# Patient Record
Sex: FEMALE | Race: WHITE | Employment: OTHER | URBAN - METROPOLITAN AREA
[De-identification: names, ages, dates, MRNs, and addresses within clinical notes are randomized per-mention and may not be internally consistent; named-entity substitution may affect disease eponyms.]

---

## 2018-05-18 ENCOUNTER — OFFICE VISIT (OUTPATIENT)
Dept: NEUROLOGY | Age: 73
End: 2018-05-18

## 2018-05-18 VITALS
OXYGEN SATURATION: 99 % | SYSTOLIC BLOOD PRESSURE: 140 MMHG | BODY MASS INDEX: 24.8 KG/M2 | HEIGHT: 63 IN | HEART RATE: 70 BPM | RESPIRATION RATE: 14 BRPM | DIASTOLIC BLOOD PRESSURE: 74 MMHG | WEIGHT: 140 LBS

## 2018-05-18 DIAGNOSIS — R20.0 NUMBNESS OF FEET: ICD-10-CM

## 2018-05-18 DIAGNOSIS — M54.50 ACUTE LOW BACK PAIN WITHOUT SCIATICA, UNSPECIFIED BACK PAIN LATERALITY: Primary | ICD-10-CM

## 2018-05-18 RX ORDER — CALCIUM CARBONATE 400(1000)
TABLET,CHEWABLE ORAL
COMMUNITY
Start: 2015-07-02

## 2018-05-18 RX ORDER — CYCLOSPORINE 0.5 MG/ML
EMULSION OPHTHALMIC
Refills: 3 | COMMUNITY
Start: 2018-04-19

## 2018-05-18 RX ORDER — DIPHENHYDRAMINE HCL 25 MG
CAPSULE ORAL
COMMUNITY
Start: 2014-05-21

## 2018-05-18 RX ORDER — CHOLECALCIFEROL (VITAMIN D3) 125 MCG
5000 CAPSULE ORAL
COMMUNITY
Start: 2016-01-21

## 2018-05-18 RX ORDER — FLUTICASONE PROPIONATE 50 MCG
SPRAY, SUSPENSION (ML) NASAL
COMMUNITY
Start: 2014-05-21 | End: 2018-06-18

## 2018-05-18 RX ORDER — ASCORBIC ACID 250 MG
TABLET ORAL
COMMUNITY
Start: 2014-05-21

## 2018-05-18 RX ORDER — LOVASTATIN 20 MG/1
TABLET ORAL
COMMUNITY
Start: 2018-03-14

## 2018-05-18 RX ORDER — ASPIRIN 81 MG/1
TABLET ORAL
COMMUNITY
Start: 2014-05-21

## 2018-05-18 RX ORDER — ESTRADIOL 0.1 MG/G
CREAM VAGINAL
COMMUNITY
Start: 2014-05-21

## 2018-05-18 RX ORDER — LANOLIN ALCOHOL/MO/W.PET/CERES
CREAM (GRAM) TOPICAL
COMMUNITY
Start: 2016-01-21 | End: 2018-10-04

## 2018-05-18 RX ORDER — MECLIZINE HYDROCHLORIDE 25 MG/1
TABLET ORAL
COMMUNITY
Start: 2014-05-21

## 2018-05-18 RX ORDER — ACETAMINOPHEN 500 MG
TABLET ORAL
COMMUNITY
Start: 2014-05-21

## 2018-05-18 NOTE — PROGRESS NOTES
NEUROLOGY NEW PATIENT CONSULTATION    REFERRED BY:  Magaly Kee NP    CHIEF COMPLAINT:  sciatica    HISTORY OF PRESENT ILLNESS    HISTORY PROVIDED BY:  Patient  Family Member:       Isidro Morales is a 81 y.o. female who I am asked to see in consultation for pain in her right buttocks. This started for her several weeks ago. She notes that any activity pushing or pulling will trigger the pain. In 1991 she had a ruptured disc and had a laminectomy. She doesn't recall the level of the disc. She has some residual issues with her left foot having a drop foot. She reports the pain goes down the front and down her lateral shin and both toes get numb. Triggers: sitting or sitting incorrectly, lifting incorrectly, and sleeping on her side. She has numbness in the toes. She notes this with sleeping. She has had this for several weeks now. She has not had recent imaging. She did have a dexa scan and is on Vit D and calcium. She denies any history of neuropathy. She denies any significant neck pain. In September she had a physical and labs were fine. She isn't sure if all vitamin levels were checked. She does not feel like she needs any medication for the pain she just wants to make sure there is no underlying structural issue going on. She has never had an EMG/NCS. She denies any tremor, falls, or numbness in the hands. She does have arthritis and takes Advil/Tylenol for this.     PMH  Arthritis      Social History     Social History    Marital status:      Spouse name: N/A    Number of children: N/A    Years of education: N/A     Social History Main Topics    Smoking status: Not on file    Smokeless tobacco: Not on file    Alcohol use Not on file    Drug use: Not on file    Sexual activity: Not on file     Other Topics Concern    Not on file     Social History Narrative       FH    No significant family history    ALLERGIES  Allergies no known allergies    CURRENT MEDS  Current Outpatient Prescriptions   Medication Sig Dispense Refill    aspirin delayed-release (ADULT ASPIRIN REGIMEN) 81 mg tablet Take  by mouth.  diphenhydrAMINE (BENADRYL) 25 mg capsule Take  by mouth.  calcium citrate-vitamin d3 (CITRACAL+D) 315-200 mg-unit tab Take  by mouth.  multivit-min-FA-lycopen-lutein (CENTRUM SILVER) 0.4-300-250 mg-mcg-mcg tab Take  by mouth.  RESTASIS 0.05 % ophthalmic emulsion INSTILL 1 DROP IN BOTH EYES BID  3    estradiol (ESTRACE) 0.01 % (0.1 mg/gram) vaginal cream Insert  into vagina.  lovastatin (MEVACOR) 20 mg tablet       meclizine (ANTIVERT) 25 mg tablet Take  by mouth.  calcium carbonate (TUMS ULTRA) 400 mg calcium (1,000 mg) chew Take  by mouth.  acetaminophen (TYLENOL EXTRA STRENGTH) 500 mg tablet Take  by mouth.  ascorbic acid, vitamin C, (VITAMIN C) 250 mg tablet Take  by mouth.  cholecalciferol, vitamin D3, 2,000 unit tab Take  by mouth.  fluticasone (FLONASE ALLERGY RELIEF) 50 mcg/actuation nasal spray by Nasal route.          REVIEW OF SYSTEMS:     Y  N       Y  N  Y  N   Y  N  [] [x] AIDS          [] [x] Falls  [] [x] Memory Loss  [] [x]  Shortness of breath  [x] [] Anxiety          [] [x] Fatigue [] [x] Muscle Pain        [] [x]  Skipped beats  [] [x] Chest Pain   [] [x] Frequent HA [] [x] Ms Weakness     [] [x]  Snoring  [] [x] Constipation [x] []Hearing loss [] [x] Nause/Vomiting  [] [x]  Stomach Pain  [] [x] Cough          [] [x]Hepatitis [] [x] Neuropathy         [] [x]  Swallowing difficulty  [] [x] Depression  [] [x]Incontinence [] [x] Poor appetite      [x] []  Vertigo  [] [x] Diarrhea       [x] [x] Joint Pain [] [x] Rash                   [] [x]  Visual disturbances  [] [x] Fainting        [] [x] Leg Swelling [] [x] Ringing ears       [] [x]  Weight changes      []Unable to obtain  ROS due to  []mental status change  []sedated   []intubated          PREVIOUS WORKUP  IMAGING: No recent neuroimaging      LABS  No results found for this or any previous visit. PHYSICAL EXAM  Visit Vitals    /74    Pulse 70    Resp 14    Ht 5' 3\" (1.6 m)    Wt 63.5 kg (140 lb)    SpO2 99%    BMI 24.8 kg/m2     General:  Alert, cooperative, no distress. Head:  Normocephalic, without obvious abnormality, atraumatic. Eyes:  Conjunctivae/corneas clear. Pupils equal, round, reactive to light. Extraocular movements intact, VFF, NO papilledema   Lungs:  Heart:   Non labored breathing  Regular rate and rhythm, no carotid bruits   Abdomen:   Soft, non-distended   Extremities: Extremities normal, atraumatic, no cyanosis or edema. Pulses: 2+ and symmetric all extremities. Skin: Skin color, texture, turgor normal. No rashes or lesions. Neurologic:  Gen: Attention normal             Language: naming, repetition, fluency normal             Memory: intact recent and remote memory  Cranial Nerves:  I: smell Not tested   II: visual fields Full to confrontation   II: pupils Equal, round, reactive to light   II: optic disc No papilledema   III,VII: ptosis none   III,IV,VI: extraocular muscles  Full ROM   V: mastication normal   V: facial light touch sensation  normal   VII: facial muscle function   symmetric   VIII: hearing symmetric   IX: soft palate elevation  normal   XI: trapezius strength  5/5   XI: sternocleidomastoid strength 5/5   XI: neck flexion strength  5/5   XII: tongue  midline     Motor: normal bulk and tone, no tremor              Strength: 5/5 in upper extremities, 5-/5 in lower extremities  Sensory: intact to LT, PP, vibration, and temperature  Coordination: FTN intact, Rhomberg negative  Gait: normal gait including tandem   Reflexes: 2+ throughout       [address] [facility] is a 81 y.o. female who presents for evaluation of pain in the right low back/buttocks. I suspect given patient's history of disc disease she may have a new disc in her lumbar spine.   Based on the distribution of her numbness I suspect it could be L2/3. We will do an MRI to evaluate this. We will also do an EMG/NCS. RECOMMENDATIONS          Visit Diagnoses     Acute low back pain without sciatica, unspecified back pain laterality    -  Primary    Relevant Medications  · Tylenol/ibuprofen as needed    Other Relevant Orders    · MRI LUMB SPINE WO CONT to evaluate for disc disease    · EMG LIMITED of bilateral lower extremity to evaluate for neuropathy    Numbness of feet        Relevant Orders    · MRI LUMB SPINE WO CONT    · EMG LIMITED  · We will get labs from PCP may need to your additional neuropathy labs      If above workup is negative will send for physical therapy for core strengthening [facility] of [address]. FU in 4 weeks    Adelaide Cardenas MD    CC: Braulio Jefferson NP  Fax: [phone]    Medications and side effects discussed with patient in detail. With any new medications prescribed, patient was given instructions on administration and side effects. Written medication information was provided to the patient as well. This note was created using voice recognition software. Despite editing, there may be syntax errors. This note will not be viewable in [address]

## 2018-05-18 NOTE — LETTER
Dear Sukh Nassar NP, Thank you for allowing me to see your patient, Rosemary Jones for a neurological consultation. Please see my impression and recommendations as outlined in my note. Sincerely, Natalie Centeno MD 
[facility] at [address] record in preparation for visit and have necessary documentation Pt did not bring medication to office visit for review Medication list reviewed and reconciled with patient Information was given to pt on Advanced Directives, Living Will 
opportunity was given for questions Chief Complaint Patient presents with  
 Other  
  sciatica  Numbness Feet bilaterally 1. Have you been to the ER, urgent care clinic since your last visit? Hospitalized since your last visit? Yes ER 2009 at [facility] for palpations 2. Have you seen or consulted any other health care providers outside of the [address] [place] [address] since your last visit? Include any pap smears or colon screening. NO 
 
 
 
NEUROLOGY NEW PATIENT CONSULTATION 
 
REFERRED BY: 
Sukh Nassar NP 
 
CHIEF COMPLAINT: 
sciatica HISTORY OF PRESENT ILLNESS HISTORY PROVIDED BY: 
Patient Family Member:  Rosemary Jones is a 81 y.o. female who I am asked to see in consultation for pain in her right buttocks. This started for her several weeks ago. She notes that any activity pushing or pulling will trigger the pain. In 1991 she had a ruptured disc and had a laminectomy. She doesn't recall the level of the disc. She has some residual issues with her left foot having a drop foot. She reports the pain goes down the front and down her lateral shin and both toes get numb. Triggers: sitting or sitting incorrectly, lifting incorrectly, and sleeping on her side. She has numbness in the toes. She notes this with sleeping. She has had this for several weeks now. She has not had recent imaging.  She did have a dexa scan and is on Vit D and calcium. She denies any history of neuropathy. She denies any significant neck pain. In September she had a physical and labs were fine. She isn't sure if all vitamin levels were checked. She does not feel like she needs any medication for the pain she just wants to make sure there is no underlying structural issue going on. She has never had an EMG/NCS. She denies any tremor, falls, or numbness in the hands. She does have arthritis and takes Advil/Tylenol for this. [facility] Social History Social History  Marital status:  Spouse name: N/A  
 Number of children: N/A  
 Years of education: N/A Social History Main Topics  Smoking status: Not on file  Smokeless tobacco: Not on file  Alcohol use Not on file  Drug use: Not on file  Sexual activity: Not on file Other Topics Concern  Not on file Social History Narrative Valente Merlos No significant family history ALLERGIES Allergies no known allergies CURRENT MEDS Current Outpatient Prescriptions Medication Sig Dispense Refill  aspirin delayed-release (ADULT ASPIRIN REGIMEN) 81 mg tablet Take  by mouth.  diphenhydrAMINE (BENADRYL) 25 mg capsule Take  by mouth.  calcium citrate-vitamin d3 (CITRACAL+D) 315-200 mg-unit tab Take  by mouth.  multivit-min-FA-lycopen-lutein (CENTRUM SILVER) 0.4-300-250 mg-mcg-mcg tab Take  by mouth.  RESTASIS 0.05 % ophthalmic emulsion INSTILL 1 DROP IN BOTH EYES BID  3  
 estradiol (ESTRACE) 0.01 % (0.1 mg/gram) vaginal cream Insert  into vagina.  lovastatin (MEVACOR) 20 mg tablet  meclizine (ANTIVERT) 25 mg tablet Take  by mouth.  calcium carbonate (TUMS ULTRA) 400 mg calcium (1,000 mg) chew Take  by mouth.  acetaminophen (TYLENOL EXTRA STRENGTH) 500 mg tablet Take  by mouth.  ascorbic acid, vitamin C, (VITAMIN C) 250 mg tablet Take  by mouth.  cholecalciferol, vitamin D3, 2,000 unit tab Take  by mouth.  fluticasone (FLONASE ALLERGY RELIEF) 50 mcg/actuation nasal spray by Nasal route. REVIEW OF SYSTEMS:  
 
Y  N       Y  N  Y  N   Y  N 
[] [x] AIDS          [] [x] Falls  [] [x] Memory Loss  [] [x]  Shortness of breath [x] [] Anxiety          [] [x] Fatigue [] [x] Muscle Pain        [] [x]  Skipped beats 
[] [x] Chest Pain   [] [x] Frequent HA [] [x] Ms Weakness     [] [x]  Snoring 
[] [x] Constipation [x] []Hearing loss [] [x] Nause/Vomiting  [] [x]  Stomach Pain 
[] [x] Cough          [] [x]Hepatitis [] [x] Neuropathy         [] [x]  Swallowing difficulty 
[] [x] Depression  [] [x]Incontinence [] [x] Poor appetite      [x] []  Vertigo 
[] [x] Diarrhea       [x] [x] Joint Pain [] [x] Rash                   [] [x]  Visual disturbances 
[] [x] Fainting        [] [x] Leg Swelling [] [x] Ringing ears       [] [x]  Weight changes []Unable to obtain  ROS due to  []mental status change  []sedated   []intubated PREVIOUS WORKUP IMAGING: No recent neuroimaging LABS No results found for this or any previous visit. PHYSICAL EXAM 
Visit Vitals  /74  Pulse 70  Resp 14  
 Ht 5' 3\" (1.6 m)  Wt 63.5 kg (140 lb)  SpO2 99%  BMI 24.8 kg/m2 General:  Alert, cooperative, no distress. Head:  Normocephalic, without obvious abnormality, atraumatic. Eyes:  Conjunctivae/corneas clear. Pupils equal, round, reactive to light. Extraocular movements intact, VFF, NO papilledema Lungs: 
Heart:   Non labored breathing Regular rate and rhythm, no carotid bruits Abdomen:   Soft, non-distended Extremities: Extremities normal, atraumatic, no cyanosis or edema. Pulses: 2+ and symmetric all extremities. Skin: Skin color, texture, turgor normal. No rashes or lesions. Neurologic:  Gen: Attention normal 
           Language: naming, repetition, fluency normal 
           Memory: intact recent and remote memory Cranial Nerves: 
I: smell Not tested II: visual fields Full to confrontation II: pupils Equal, round, reactive to light II: optic disc No papilledema III,VII: ptosis none III,IV,VI: extraocular muscles  Full ROM V: mastication normal  
V: facial light touch sensation  normal  
VII: facial muscle function   symmetric VIII: hearing symmetric IX: soft palate elevation  normal  
XI: trapezius strength  5/5 XI: sternocleidomastoid strength 5/5 XI: neck flexion strength  5/5 XII: tongue  midline Motor: normal bulk and tone, no tremor Strength: 5/5 in upper extremities, 5-/5 in lower extremities Sensory: intact to LT, PP, vibration, and temperature Coordination: FTN intact, Rhomberg negative Gait: normal gait including tandem Reflexes: 2+ throughout IMPRESSION Swati Parks is a 81 y.o. female who presents for evaluation of pain in the right low back/buttocks. I suspect given patient's history of disc disease she may have a new disc in her lumbar spine. Based on the distribution of her numbness I suspect it could be L2/3. We will do an MRI to evaluate this. We will also do an EMG/NCS. RECOMMENDATIONS Visit Diagnoses Acute low back pain without sciatica, unspecified back pain laterality    -  Primary Relevant Medications · Tylenol/ibuprofen as needed Other Relevant Orders · MRI LUMB SPINE WO CONT to evaluate for disc disease · EMG LIMITED of bilateral lower extremity to evaluate for neuropathy Numbness of feet Relevant Orders · MRI LUMB SPINE WO CONT  
 · EMG LIMITED · We will get labs from PCP may need to your additional neuropathy labs If above workup is negative will send for physical therapy for core strengthening at [facility] [place] [address]. FU in 4 weeks Graciela Rios MD 
 
CC: Bettejane Brunner, NP Fax: [phone] Medications and side effects discussed with patient in detail.   With any new medications prescribed, patient was given instructions on administration and side effects. Written medication information was provided to the patient as well. This note was created using voice recognition software. Despite editing, there may be syntax errors. This note will not be viewable in [address]

## 2018-05-18 NOTE — MR AVS SNAPSHOT
[facility] 
 
 
 [place] [address] [id number] [phone] Patient: Parul Reyes MRN: [id number] [id number] Visit Information Date & Time Provider Department Dept. Phone Encounter #  
 5/18/2018  2:00 PM Anthony Arciniega MD [facility] [phone] [id number] Your Appointments 6/7/2018  1:00 PM  
PROCEDURE with EMG SCHEDULE 1991 [facility] Appt Note: back pain (low) [place] [address] [id number] [phone]  
  
   
 [place] [address] [id number] [address] [place] 6/18/2018  1:00 PM  
Follow Up with Anthony Arciniega MD  
[address] [place] [address] Neurology Clinic [facility] Appt Note: numbness feet [address] [id number] [facility] [address]  
[facility] [address] [facility] [address] Upcoming Health Maintenance Date Due Hepatitis C Screening 1945 DTaP/Tdap/Td series (1 - Tdap) 8/4/1968 BREAST CANCER SCRN MAMMOGRAM 8/4/1997 FOBT Q 1 YEAR AGE 50-75 8/4/1997 ZOSTER VACCINE AGE 60> 6/4/2007 GLAUCOMA SCREENING Q2Y 8/4/2012 Pneumococcal 65+ Low/Medium Risk (1 of 2 - PCV13) 8/4/2012 MEDICARE YEARLY EXAM 3/20/2018 Influenza Age 5 to Adult 8/1/2018 Allergies as of 5/18/2018  Never Reviewed No Known Allergies Current Immunizations  Never Reviewed No immunizations on file. Not reviewed this visit You Were Diagnosed With   
  
 Codes Comments Acute low back pain without sciatica, unspecified back pain laterality    -  Primary ICD-10-CM: M54.5 ICD-9-CM: 724.2 Numbness of feet     ICD-10-CM: R20.0 ICD-9-CM: 420. 0 Vitals BP Pulse Resp Height(growth percentile) Weight(growth percentile) SpO2  
 140/74 70 14 5' 3\" (1.6 m) 140 lb (63.5 kg) 99% BMI 24.8 kg/m2 BMI and BSA Data Body Mass Index Body Surface Area  
 24.8 kg/m 2 1.68 m 2 Your Updated Medication List  
  
   
This list is accurate as of 5/18/18  3:10 PM.  Always use your most recent med list.  
  
  
  
  
 ADULT ASPIRIN REGIMEN 81 mg tablet Generic drug:  aspirin delayed-release Take  by mouth. ascorbic acid (vitamin C) 250 mg tablet Commonly known as:  VITAMIN C Take  by mouth. BENADRYL 25 mg capsule Generic drug:  diphenhydrAMINE Take  by mouth.  
  
 calcium citrate-vitamin d3 315-200 mg-unit Tab Commonly known as:  CITRACAL+D Take  by mouth. CENTRUM SILVER 0.4-300-250 mg-mcg-mcg Tab Generic drug:  multivit-min-FA-lycopen-lutein Take  by mouth. cholecalciferol (vitamin D3) 2,000 unit Tab Take  by mouth. ESTRACE 0.01 % (0.1 mg/gram) vaginal cream  
Generic drug:  estradiol Insert  into vagina. FLONASE ALLERGY RELIEF 50 mcg/actuation nasal spray Generic drug:  fluticasone  
by Nasal route.  
  
 lovastatin 20 mg tablet Commonly known as:  MEVACOR  
  
 meclizine 25 mg tablet Commonly known as:  ANTIVERT Take  by mouth. RESTASIS 0.05 % ophthalmic emulsion Generic drug:  cycloSPORINE INSTILL 1 DROP IN BOTH EYES BID  
  
 TUMS ULTRA 400 mg calcium (1,000 mg) Chew Generic drug:  calcium carbonate Take  by mouth. TYLENOL EXTRA STRENGTH 500 mg tablet Generic drug:  acetaminophen Take  by mouth. To-Do List   
 05/19/2018 Neurology:  EMG LIMITED   
  
 05/19/2018 Imaging:  MRI LUMB SPINE WO CONT Patient Instructions Teodoro Prasad 1721 What is a living will? A living will is a legal form you use to write down the kind of care you want at the end of your life. It is used by the health professionals who will treat you if you aren't able to decide for yourself.  
If you put your wishes in writing, your loved ones and others will know what kind of care you want. They won't need to guess. This can ease your mind and be helpful to others. A living will is not the same as an estate or property will. An estate will explains what you want to happen with your money and property after you die. Is a living will a legal document? A living will is a legal document. Each state has its own laws about living quiroz. If you move to another state, make sure that your living will is legal in the state where you now live. Or you might use a universal form that has been approved by many states. This kind of form can sometimes be completed and stored online. Your electronic copy will then be available wherever you have a connection to the Internet. In most cases, doctors will respect your wishes even if you have a form from a different state. · You don't need an  to complete a living will. But legal advice can be helpful if your state's laws are unclear, your health history is complicated, or your family can't agree on what should be in your living will. · You can change your living will at any time. Some people find that their wishes about end-of-life care change as their health changes. · In addition to making a living will, think about completing a medical power of  form. This form lets you name the person you want to make end-of-life treatment decisions for you (your \"health care agent\") if you're not able to. Many hospitals and nursing homes will give you the forms you need to complete a living will and a medical power of . · Your living will is used only if you can't make or communicate decisions for yourself anymore. If you become able to make decisions again, you can accept or refuse any treatment, no matter what you wrote in your living will. · Your state may offer an online registry. This is a place where you can store your living will online so the doctors and nurses who need to treat you can find it right away. What should you think about when creating a living will? Talk about your end-of-life wishes with your family members and your doctor. Let them know what you want. That way the people making decisions for you won't be surprised by your choices. Think about these questions as you make your living will: · Do you know enough about life support methods that might be used? If not, talk to your doctor so you know what might be done if you can't breathe on your own, your heart stops, or you're unable to swallow. · What things would you still want to be able to do after you receive life-support methods? Would you want to be able to walk? To speak? To eat on your own? To live without the help of machines? · If you have a choice, where do you want to be cared for? In your home? At a hospital or nursing home? · Do you want certain Moravian practices performed if you become very ill? · If you have a choice at the end of your life, where would you prefer to die? At home? In a hospital or nursing home? Somewhere else? · Would you prefer to be buried or cremated? · Do you want your organs to be donated after you die? What should you do with your living will? · Make sure that your family members and your health care agent have copies of your living will. · Give your doctor a copy of your living will to keep in your medical record. If you have more than one doctor, make sure that each one has a copy. · You may want to put a copy of your living will where it can be easily found. Where can you learn more? Go to http://cary-katty.info/. Enter T194 in the search box to learn more about \"Learning About Living [place]. \" Current as of: September 24, 2016 Content Version: 11.4 © 0968-9904 Healthwise, Incorporated. Care instructions adapted under license by [employer] (which disclaims liability or warranty for this information).  If you have questions about a medical condition or this instruction, always ask your healthcare professional. [address] any warranty or liability for your use of this information. Advance Directives: Care Instructions Your Care Instructions An advance directive is a legal way to state your wishes at the end of your life. It tells your family and your doctor what to do if you can no longer say what you want. There are two main types of advance directives. You can change them any time that your wishes change. · A living will tells your family and your doctor your wishes about life support and other treatment. · A durable power of  for health care lets you name a person to make treatment decisions for you when you can't speak for yourself. This person is called a health care agent. If you do not have an advance directive, decisions about your medical care may be made by a doctor or a  who doesn't know you. It may help to think of an advance directive as a gift to the people who care for you. If you have one, they won't have to make tough decisions by themselves. Follow-up care is a key part of your treatment and safety. Be sure to make and go to all appointments, and call your doctor if you are having problems. It's also a good idea to know your test results and keep a list of the medicines you take. How can you care for yourself at home? · Discuss your wishes with your loved ones and your doctor. This way, there are no surprises. · Many states have a unique form. Or you might use a universal form that has been approved by many states. This kind of form can sometimes be completed and stored online. Your electronic copy will then be available wherever you have a connection to the Internet. In most cases, doctors will respect your wishes even if you have a form from a different state. · You don't need a  to do an advance directive. But you may want to get legal advice. · Think about these questions when you prepare an advance directive: ¨ Who do you want to make decisions about your medical care if you are not able to? Many people choose a family member or close friend. ¨ Do you know enough about life support methods that might be used? If not, talk to your doctor so you understand. ¨ What are you most afraid of that might happen? You might be afraid of having pain, losing your independence, or being kept alive by machines. ¨ Where would you prefer to die? Choices include your home, a hospital, or a nursing home. ¨ Would you like to have information about hospice care to support you and your family? ¨ Do you want to donate organs when you die? ¨ Do you want certain Nondenominational practices performed before you die? If so, put your wishes in the advance directive. · Read your advance directive every year, and make changes as needed. When should you call for help? Be sure to contact your doctor if you have any questions. Where can you learn more? Go to http://cary-katty.info/. Enter R264 in the search box to learn more about \"Advance Directives: Care Instructions. \" Current as of: September 24, 2016 Content Version: 11.4 © 0270-0376 [employer]. Care instructions adapted under license by [employer] (which disclaims liability or warranty for this information). If you have questions about a medical condition or this instruction, always ask your healthcare professional. [place] [address] any warranty or liability for your use of this information. PRESCRIPTION REFILL POLICY [facility] Statement to Patients April 1, 2014 In an effort to ensure the large volume of patient prescription refills is processed in the most efficient and expeditious manner, we are asking our patients to assist us by calling your Pharmacy for all prescription refills, this will include also your  Mail Order Pharmacy. The pharmacy will contact our office electronically to continue the refill process. Please do not wait until the last minute to call your pharmacy. We need at least 48 hours (2days) to fill prescriptions. We also encourage you to call your pharmacy before going to  your prescription to make sure it is ready. With regard to controlled substance prescription refill requests (narcotic refills) that need to be picked up at our office, we ask your cooperation by providing us with at least 72 hours (3days) notice that you will need a refill. We will not refill narcotic prescription refill requests after 4:00pm on any weekday, Monday through Thursday, or after 2:00pm on Fridays, or on the weekends. We encourage everyone to explore another way of getting your prescription refill request processed using Nebo, our patient web portal through our electronic medical record system. Nebo is an efficient and effective way to communicate your medication request directly to the office and  downloadable as an maddi on your smart phone . Nebo also features a review functionality that allows you to view your medication list as well as leave messages for your physician. Are you ready to get connected? If so please review the attatched instructions or speak to any of our staff to get you set up right away! Thank you so much for your cooperation. Should you have any questions please contact our Practice Administrator. The Physicians and Staff,  [facility] Patient Instruction Plan/ Result Policy If we have ordered testing for you, know that; \"NO NEWS IS GOOD NEWS! \" It is our policy that we know longer call patients with results, nor do we  give test results over the phone. We schedule follow up appointments so that your results can be discussed in person.  This allows you to address any questions you have regarding the results. If something of concern is revealed on your test, we will contact you to discuss the matter and if needed schedule a sooner follow up appointment. Additionally, results may be found by using the My Chart feature and one of our patient service representatives at the  can give you instructions on how to access this feature to utilize our electronic medical record system. Thank you for your understanding. Introducing [facility]! [facility] introduces Basys patient portal. Now you can access parts of your medical record, email your doctor's office, and request medication refills online. 1. In your internet browser, go to [email or web address] 2. Click on the First Time User? Click Here link in the Sign In box. You will see the New Member Sign Up page. 3. Enter your Basys Access Code exactly as it appears below. You will not need to use this code after youve completed the sign-up process. If you do not sign up before the expiration date, you must request a new code. · Basys Access Code: [email or web address] Expires: 8/16/2018  1:34 PM 
 
4. Enter the last four digits of your Social Security Number (xxxx) and Date of Birth (mm/dd/yyyy) as indicated and click Submit. You will be taken to the next sign-up page. 5. Create a Basys ID. This will be your Basys login ID and cannot be changed, so think of one that is secure and easy to remember. 6. Create a Basys password. You can change your password at any time. 7. Enter your Password Reset Question and Answer. This can be used at a later time if you forget your password. 8. Enter your e-mail address. You will receive e-mail notification when new information is available in [address] 9. Click Sign Up. You can now view and download portions of your medical record.  
10. Click the Download Summary menu link to download a portable copy of your medical information. If you have questions, please visit the Frequently Asked Questions section of the [place] website. Remember, [place] is NOT to be used for urgent needs. For medical emergencies, dial 911. Now available from your iPhone and Android! Please provide this summary of care documentation to your next provider. Your primary care clinician is listed as Merary Lin. If you have any questions after today's visit, please call [phone].

## 2018-05-18 NOTE — PROGRESS NOTES
Reviewed record in preparation for visit and have necessary documentation  Pt did not bring medication to office visit for review  Medication list reviewed and reconciled with patient  Information was given to pt on Advanced Directives, Living Will  opportunity was given for questions      Chief Complaint   Patient presents with    Other     sciatica     Numbness     Feet bilaterally     1. Have you been to the ER, urgent care clinic since your last visit? Hospitalized since your last visit? Yes ER 2009 at [facility] for palpations    2. Have you seen or consulted any other health care providers outside of the [address] [place] [address] since your last visit? Include any pap smears or colon screening.  NO

## 2018-05-18 NOTE — PATIENT INSTRUCTIONS
Learning About Ralph Irwin  What is a living will? A living will is a legal form you use to write down the kind of care you want at the end of your life. It is used by the health professionals who will treat you if you aren't able to decide for yourself. If you put your wishes in writing, your loved ones and others will know what kind of care you want. They won't need to guess. This can ease your mind and be helpful to others. A living will is not the same as an estate or property will. An estate will explains what you want to happen with your money and property after you die. Is a living will a legal document? A living will is a legal document. Each state has its own laws about living quiroz. If you move to another state, make sure that your living will is legal in the state where you now live. Or you might use a universal form that has been approved by many states. This kind of form can sometimes be completed and stored online. Your electronic copy will then be available wherever you have a connection to the Internet. In most cases, doctors will respect your wishes even if you have a form from a different state. · You don't need an  to complete a living will. But legal advice can be helpful if your state's laws are unclear, your health history is complicated, or your family can't agree on what should be in your living will. · You can change your living will at any time. Some people find that their wishes about end-of-life care change as their health changes. · In addition to making a living will, think about completing a medical power of  form. This form lets you name the person you want to make end-of-life treatment decisions for you (your \"health care agent\") if you're not able to. Many hospitals and nursing homes will give you the forms you need to complete a living will and a medical power of .   · Your living will is used only if you can't make or communicate decisions for yourself anymore. If you become able to make decisions again, you can accept or refuse any treatment, no matter what you wrote in your living will. · Your state may offer an online registry. This is a place where you can store your living will online so the doctors and nurses who need to treat you can find it right away. What should you think about when creating a living will? Talk about your end-of-life wishes with your family members and your doctor. Let them know what you want. That way the people making decisions for you won't be surprised by your choices. Think about these questions as you make your living will:  · Do you know enough about life support methods that might be used? If not, talk to your doctor so you know what might be done if you can't breathe on your own, your heart stops, or you're unable to swallow. · What things would you still want to be able to do after you receive life-support methods? Would you want to be able to walk? To speak? To eat on your own? To live without the help of machines? · If you have a choice, where do you want to be cared for? In your home? At a hospital or nursing home? · Do you want certain Tenriism practices performed if you become very ill? · If you have a choice at the end of your life, where would you prefer to die? At home? In a hospital or nursing home? Somewhere else? · Would you prefer to be buried or cremated? · Do you want your organs to be donated after you die? What should you do with your living will? · Make sure that your family members and your health care agent have copies of your living will. · Give your doctor a copy of your living will to keep in your medical record. If you have more than one doctor, make sure that each one has a copy. · You may want to put a copy of your living will where it can be easily found. Where can you learn more? Go to http://cary-katty.info/.   Enter S286 in the search box to learn more about \"Learning About Living Susy. \"  Current as of: September 24, 2016  Content Version: 11.4  © 9225-4172 [employer]. Care instructions adapted under license by [employer] (which disclaims liability or warranty for this information). If you have questions about a medical condition or this instruction, always ask your healthcare professional. [address] any warranty or liability for your use of this information. Advance Directives: Care Instructions  Your Care Instructions  An advance directive is a legal way to state your wishes at the end of your life. It tells your family and your doctor what to do if you can no longer say what you want. There are two main types of advance directives. You can change them any time that your wishes change. · A living will tells your family and your doctor your wishes about life support and other treatment. · A durable power of  for health care lets you name a person to make treatment decisions for you when you can't speak for yourself. This person is called a health care agent. If you do not have an advance directive, decisions about your medical care may be made by a doctor or a  who doesn't know you. It may help to think of an advance directive as a gift to the people who care for you. If you have one, they won't have to make tough decisions by themselves. Follow-up care is a key part of your treatment and safety. Be sure to make and go to all appointments, and call your doctor if you are having problems. It's also a good idea to know your test results and keep a list of the medicines you take. How can you care for yourself at home? · Discuss your wishes with your loved ones and your doctor. This way, there are no surprises. · Many states have a unique form. Or you might use a universal form that has been approved by many states. This kind of form can sometimes be completed and stored online.  Your electronic copy will then be available wherever you have a connection to the Internet. In most cases, doctors will respect your wishes even if you have a form from a different state. · You don't need a  to do an advance directive. But you may want to get legal advice. · Think about these questions when you prepare an advance directive:  ¨ Who do you want to make decisions about your medical care if you are not able to? Many people choose a family member or close friend. ¨ Do you know enough about life support methods that might be used? If not, talk to your doctor so you understand. ¨ What are you most afraid of that might happen? You might be afraid of having pain, losing your independence, or being kept alive by machines. ¨ Where would you prefer to die? Choices include your home, a hospital, or a nursing home. ¨ Would you like to have information about hospice care to support you and your family? ¨ Do you want to donate organs when you die? ¨ Do you want certain Mandaeism practices performed before you die? If so, put your wishes in the advance directive. · Read your advance directive every year, and make changes as needed. When should you call for help? Be sure to contact your doctor if you have any questions. Where can you learn more? Go to http://cary-katty.info/. Enter R264 in the search box to learn more about \"Advance Directives: Care Instructions. \"  Current as of: September 24, 2016  Content Version: 11.4  © 7145-4423 [employer]. Care instructions adapted under license by [employer] (which disclaims liability or warranty for this information). If you have questions about a medical condition or this instruction, always ask your healthcare professional. [place] [address] any warranty or liability for your use of this information.   10 [facility]   Statement to Patients  April 1, 2014      In an effort to ensure the large volume of patient prescription refills is processed in the most efficient and expeditious manner, we are asking our patients to assist us by calling your Pharmacy for all prescription refills, this will include also your  Mail Order Pharmacy. The pharmacy will contact our office electronically to continue the refill process. Please do not wait until the last minute to call your pharmacy. We need at least 48 hours (2days) to fill prescriptions. We also encourage you to call your pharmacy before going to  your prescription to make sure it is ready. With regard to controlled substance prescription refill requests (narcotic refills) that need to be picked up at our office, we ask your cooperation by providing us with at least 72 hours (3days) notice that you will need a refill. We will not refill narcotic prescription refill requests after 4:00pm on any weekday, Monday through Thursday, or after 2:00pm on Fridays, or on the weekends. We encourage everyone to explore another way of getting your prescription refill request processed using Polyera, our patient web portal through our electronic medical record system. Polyera is an efficient and effective way to communicate your medication request directly to the office and  downloadable as an maddi on your smart phone . Polyera also features a review functionality that allows you to view your medication list as well as leave messages for your physician. Are you ready to get connected? If so please review the attatched instructions or speak to any of our staff to get you set up right away! Thank you so much for your cooperation. Should you have any questions please contact our Practice Administrator. The Physicians and Staff,  [facility]   Patient Instruction Plan/ Result Policy    If we have ordered testing for you, know that; [facility] NEWS IS GOOD NEWS! \" It is our policy that we know longer call patients with results, nor do we  give test results over the phone. We schedule follow up appointments so that your results can be discussed in person. This allows you to address any questions you have regarding the results. If something of concern is revealed on your test, we will contact you to discuss the matter and if needed schedule a sooner follow up appointment. Additionally, results may be found by using the My Chart feature and one of our patient service representatives at the  can give you instructions on how to access this feature to utilize our electronic medical record system. Thank you for your understanding.

## 2018-05-30 ENCOUNTER — HOSPITAL ENCOUNTER (OUTPATIENT)
Dept: MRI IMAGING | Age: 73
Discharge: HOME | End: 2018-05-30
Attending: PSYCHIATRY & NEUROLOGY
Payer: MEDICARE

## 2018-05-30 DIAGNOSIS — R20.0 NUMBNESS OF FEET: ICD-10-CM

## 2018-05-30 DIAGNOSIS — M54.50 ACUTE LOW BACK PAIN WITHOUT SCIATICA, UNSPECIFIED BACK PAIN LATERALITY: ICD-10-CM

## 2018-05-30 PROCEDURE — 72148 MRI LUMBAR SPINE W/O DYE: CPT

## 2018-06-07 ENCOUNTER — OFFICE VISIT (OUTPATIENT)
Dept: NEUROLOGY | Age: 73
End: 2018-06-07

## 2018-06-07 DIAGNOSIS — R20.2 PARESTHESIA: Primary | ICD-10-CM

## 2018-06-07 NOTE — PROCEDURES
EMG/ NCS Report  DRUG REHABILITATION  - DAY ONE [facility]ID[facility]  [facility]  [facility], [address] [place], [address]   Ph: [phone]   FAX: [phone]/ [phone]  Test Date:  2018    Patient: Marlene Kohli : 1945 Physician: Candice Yu MD   Sex: Female Height: ' \" Ref PhysKristeen Pass   ID#: [id number] Weight:  lbs. Technician: Batool Reardon     Patient History / Exam:  Shaina Prabhakar RT. >LT. History is confusing due to patient unable to determine timing of symptoms. Patient states she had lower back surgery for left foot weakness which got better, but now came back of unknown time frame. Patient recently developed right lower extremity weakness. Also having R hand numbness. (+) R buttock pain that comes and goes. Patient is coming for neuropathy evaluation. EMG & NCV Findings:  Evaluation of the left Fibular motor nerve showed prolonged distal onset latency (8.0 ms), normal amplitude (2.0 mV), decreased conduction velocity (B Fib-Ankle, 13 m/s), and decreased conduction velocity (Poplt-B Fib, 15 m/s). The right Fibular motor nerve showed normal distal onset latency (6.5 ms), normal amplitude (2.5 mV), decreased conduction velocity (B Fib-Ankle, 15 m/s), and decreased conduction velocity (Poplt-B Fib, 37 m/s). The left Fibular TA motor nerve showed prolonged distal onset latency (9.1 ms), reduced amplitude (1.8 mV), and decreased conduction velocity (Poplit-Fib Head, 21 m/s). The right Fibular TA motor nerve showed prolonged distal onset latency (6.1 ms), normal amplitude (3.1 mV), and decreased conduction velocity (Poplit-Fib Head, 27 m/s). The right median motor nerve showed prolonged distal onset latency (6.6 ms) and normal amplitude (9.9 mV). The left tibial motor and the right tibial motor nerves showed no response (Ankle) and no response (Knee).   The right ulnar motor nerve showed normal distal onset latency (3.0 ms), normal amplitude (9.6 mV), decreased conduction velocity (B Elbow-Wrist, 47 m/s), and decreased conduction velocity (A Elbow-B Elbow, 48 m/s). The right median sensory nerve showed no response (Wrist). The right radial sensory nerve showed normal distal peak latency (2.7 ms) and normal amplitude (17.6 µV). The left Sup Fibular sensory nerve showed no response (Lower leg). The right Sup Fibular sensory nerve showed no response (Lower leg) and no response (Site 2). The left sural sensory nerve showed no response (Calf). The right sural sensory nerve showed no response (Calf) and no response (Site 2). The right ulnar sensory nerve showed prolonged distal peak latency (4.4 ms) and normal amplitude (17.6 µV). F Wave studies indicate that the left tibial F wave has prolonged latency (73.10 ms). The right tibial F wave has prolonged latency (83.42 ms). The right ulnar F wave has prolonged latency (33.93 ms). Left vs. Right comparison data for the tibial F wave indicates abnormal L-R latency difference (10.33 ms). H-reflex studies indicate that the left tibial H-reflex has no response. The right tibial H-reflex has no response. Needle evaluation of the right extensor digitorum brevis, the right anterior tibialis, the right medial gastrocnemius, the right vastus lateralis, and the left medial gastrocnemius muscles showed diminished recruitment. The right abductor hallucis muscle showed increased insertional activity, slightly increased spontaneous activity, and recruitment. The right posterior tibialis and the left posterior tibialis muscles showed recruitment. The left extensor digitorum brevis muscle showed diminished recruitment, Incr Duration, and slightly increased polyphasic potentials. The left abductor hallucis muscle showed diminished recruitment and Incr Duration. The left anterior tibialis muscle showed diminished recruitment, Incr Duration, and increased motor unit amplitude.   All remaining muscles (as indicated in the following table) showed no evidence of electrical instability. Impression:  ABNORMAL    Extensive electrodiagnostic examination of the right upper and bilateral lower extremities shows the followin) Absent sensory responses in both lower extremities. Absent bilateral tibial motor responses. Prolonged fibular motor onset latencies and conduction slowing with amplitude drop bilaterally. Absent H-reflex and Prolonged F-responses. 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     2) Possible superimposed right median neuropathy at or distal to the wrist, severe in degree electrically          Parish Mi MD  Diplomate, American Board of Psychiatry and Neurology  Diplomate, Neuromuscular Medicine  Diplomate, American Board of Electrodiagnostic Medicine  Director, [address] [place] [address] Accredited Laboratory with Exemplary Status        Nerve Conduction Studies  Anti Sensory Summary Table     Stim Site NR Peak (ms) Norm Peak (ms) P-T Amp (µV) Norm P-T Amp Site1 Site2 Dist (cm)   Right Median Anti Sensory (2nd Digit)  31°C   Wrist NR  <4  >13 Wrist 2nd Digit 14.0   Elbow    6.7  8.3  Elbow Wrist 0.0   Right Radial Anti Sensory (Base 1st Digit)  23.4°C   Wrist    2.7 <2.8 17.6 >11 Wrist Base 1st Digit 10.0   Left Sup Fibular Anti Sensory (Lat ankle)  31.4°C   Lower leg NR  <4.6  >4 Lower leg Lat ankle 10.0   Right Sup Fibular Anti Sensory (Lat ankle)  31.6°C   Lower leg NR  <4.6  >4 Lower leg Lat ankle 10.0   Site 2 NR          Left Sural Anti Sensory (Lat Mall)  32.7°C   Calf NR  <4.5  >4.0 Calf Lat Mall 14.0   Right Sural Anti Sensory (Lat Mall)  32.8°C   Calf NR  <4.5  >4.0 Calf Lat Mall 14.0   Site 2 NR          Right Ulnar Anti Sensory (5th Digit)  31.9°C   Wrist    4.4 <4.0 17.6 >9 Wrist 5th Digit 14.0   B Elbow    4.2  5.8  B Elbow Wrist 0.0 Motor Summary Table     Stim Site NR Onset (ms) Norm Onset (ms) O-P Amp (mV) Norm O-P Amp Amp (Prev) (%) Site1 Site2 Dist (cm) Arturo (m/s) Norm Arturo (m/s)   Left Fibular Motor (Ext Dig Brev)  34.4°C   Ankle    8.0 <6.5 2.0 >1.1 100.0 Ankle Ext Dig Brev 8.0     B Fib    31.8  1.2  60.0 B Fib Ankle 30.0 13 >38   Poplt    38.3  0.9  75.0 Poplt B Fib 10.0 15 >42   Right Fibular Motor (Ext Dig Brev)  30.6°C   Ankle    6.5 <6.5 2.5 >1.1 100.0 Ankle Ext Dig Brev 8.0     B Fib    24.9  1.3  52.0 B Fib Ankle 27.0 15 >38   Poplt    27.6  1.6  123.1 Poplt B Fib 10.0 37 >42   Left Fibular TA Motor (Tib Ant)  27.8°C   Fib Head    9.1 <4.5 1.8 >3.0 100.0 Fib Head Tib Ant 10.0     Poplit    13.8  2.0  111.1 Poplit Fib Head 10.0 21 >40   Right Fibular TA Motor (Tib Ant)  29.9°C   Fib Head    6.1 <4.5 3.1 >3.0 100.0 Fib Head Tib Ant 10.0     Poplit    9.8  3.1  100.0 Poplit Fib Head 10.0 27 >40   Right Median Motor (Abd Poll Brev)  31.3°C   Wrist    6.6 <4.5 9.9 >4.1 100.0 Wrist Abd Poll Brev 8.0     Elbow    11.9  9.0  90.9 Elbow Wrist 0.0  >49   Axilla    6.5  9.6  106.7 Axilla Elbow 0.0     Left Tibial Motor (Abd Ramos Brev)  28.9°C   Ankle NR  <6.1  >1.1  Ankle Abd Ramos Brev 8.0     Knee NR      Knee Ankle 0.0  >39       13.8  0.0          Right Tibial Motor (Abd Ramos Brev)  30.3°C   Ankle NR  <6.1  >1.1  Ankle Abd Ramos Brev 8.0     Knee NR             Right Ulnar Motor (Abd Dig Minimi)  30.5°C   Wrist    3.0 <3.1 9.6 >7.0 100.0 Wrist Abd Dig Minimi 8.0  >50   B Elbow    7.3  8.4  87.5 B Elbow Wrist 20.0 47 >50   A Elbow    9.4  7.7  91.7 A Elbow B Elbow 10.0 48 >50     F Wave Studies     NR F-Lat (ms) Lat Norm (ms) L-R F-Lat (ms) L-R Lat Norm   Left Tibial (Mrkrs) (Abd Hallucis)  28.6°C      73.10 <56 10.33 <5.7   Right Tibial (Mrkrs) (Abd Hallucis)  30.2°C      83.42 <56 10.33 <5.7   Right Ulnar (Mrkrs) (Abd Dig Min)  29.7°C      33.93 <32  <2.5     H Reflex Studies     NR H-Lat (ms) L-R H-Lat (ms) L-R Lat Norm   Left Tibial (Gastroc)  28.3°C   NR   <2.0   Right Tibial (Gastroc)  30°C   NR   <2.0     EMG     Side Muscle Nerve Root Ins Act Fibs Psw Recrt Duration Amp Poly Comment   Right Ext Dig Brev Dp Br Peron L5, S1 Nml Nml Nml Reduced Nml Nml Nml    Right AbdHallucis MedPlantar S1-2 Incr 1+ 1+ SMU Nml Nml Nml    Right PostTibialis Tibial L5, S1 Nml Nml Nml SMU Nml Nml Nml    Right AntTibialis Dp Br Peron L4-5 Nml Nml Nml Reduced Nml Nml Nml    Right MedGastroc Tibial S1-2 Nml Nml Nml Reduced Nml Nml Nml    Right VastusLat Femoral L2-4 Nml Nml Nml Reduced Nml Nml Nml    Left Ext Dig Brev Dp Br Peron L5, S1 Nml Nml Nml Reduced Incr Nml 1+    Left AbdHallucis MedPlantar S1-2 Nml Nml Nml Reduced Incr Nml Nml    Left PostTibialis Tibial L5, S1 Nml Nml Nml SMU Nml Nml Nml    Left AntTibialis Dp Br Peron L4-5 Nml Nml Nml Reduced Incr Incr Nml    Left MedGastroc Tibial S1-2 Nml Nml Nml Reduced Nml Nml Nml    Left VastusLat Femoral L2-4 Nml Nml Nml Nml Nml Nml Nml    Left GluteusMed SupGluteal L4-S1 Nml Nml Nml Nml Nml Nml Nml    Left Lower Lumb Parasp Rami L5,S1 Nml Nml Nml Nml Nml Nml Nml                Waveforms:

## 2018-06-08 ENCOUNTER — DOCUMENTATION ONLY (OUTPATIENT)
Dept: NEUROLOGY | Age: 73
End: 2018-06-08

## 2018-06-08 NOTE — PROGRESS NOTES
Discussed EMG results with Dr. Maurice Lux. He recommends doing a spinal tap first to look for increased protein to confirm diagnosis. Also order for neuropathy work up if not done in the past (SPEP/JIMENA, TSH, Vit B12, ESR, MICHAEL, HgbA1c). Start IVIG 0.4 grams/kg/day x 5 treatment if elevated CSF protein. Physical therapy as well. We will discuss this with patient when I see her next week in the office. Radha Fernandez MD     This note will not be viewable in [address]

## 2018-06-18 ENCOUNTER — OFFICE VISIT (OUTPATIENT)
Dept: NEUROLOGY | Age: 73
End: 2018-06-18

## 2018-06-18 VITALS
BODY MASS INDEX: 24.98 KG/M2 | OXYGEN SATURATION: 95 % | DIASTOLIC BLOOD PRESSURE: 64 MMHG | HEART RATE: 72 BPM | RESPIRATION RATE: 16 BRPM | HEIGHT: 63 IN | WEIGHT: 141 LBS | SYSTOLIC BLOOD PRESSURE: 130 MMHG

## 2018-06-18 DIAGNOSIS — G62.9 POLYNEUROPATHY: ICD-10-CM

## 2018-06-18 DIAGNOSIS — G62.9 POLYNEUROPATHY: Primary | ICD-10-CM

## 2018-06-18 DIAGNOSIS — M54.50 ACUTE LOW BACK PAIN WITHOUT SCIATICA, UNSPECIFIED BACK PAIN LATERALITY: ICD-10-CM

## 2018-06-18 DIAGNOSIS — G56.01 CARPAL TUNNEL SYNDROME OF RIGHT WRIST: ICD-10-CM

## 2018-06-18 DIAGNOSIS — R79.89 LOW VITAMIN D LEVEL: ICD-10-CM

## 2018-06-18 DIAGNOSIS — R20.2 PARESTHESIA OF BOTH LEGS: ICD-10-CM

## 2018-06-18 DIAGNOSIS — R73.9 HYPERGLYCEMIA: ICD-10-CM

## 2018-06-18 NOTE — LETTER
Chief Complaint Patient presents with  Numbness Feet 1. Have you been to the ER, urgent care clinic since your last visit? Hospitalized since your last visit? No 
 
2. Have you seen or consulted any other health care providers outside of the [address] [place] [address] since your last visit? Include any pap smears or colon screening. No  
 
Reviewed record in preparation for visit and have necessary documentation Pt did not bring medication to office visit for review Medication list reviewed and reconciled with patient Information was given to pt on Advanced Directives, Living Will 
opportunity was given for questions Neurology Progress Note Patient ID: 
Manas Taylor [id number] 
81 y.o. 
1945 HISTORY PROVIDED BY: 
Patient Family Member:  Chief Complaint: Numbness in the feet Subjective:  
 Ms. Keven Dukes is here for follow up today of numbness. She was having some really bad pain in her right buttocks but this is improved. Since our last visit she did have testing done. This included an MRI of the lumbar spine that show multiple areas of disc disease. Subsequently she had an EMG/NCS. This actually is more concerning for AIDP versus CIDP. Discussed with patient she has no prior history of Guillain-Barré syndrome or AIDP. She does have a history of previous laminectomy. Her EMG also showed right carpal tunnel that is severe and degree. Patient reports that her main issues she wants to make sure she does not have any underlying disease. We did discuss further workup that can be done. She did bring blood work with her today that she previously had. We will add to this. Currently patient denies any new focal numbness or weakness. No bowel or bladder incontinence Recap: 
Manas Taylor is a 81 y.o. female who I am asked to see in consultation for pain in her right buttocks. This started for her several weeks ago. She notes that any activity pushing or pulling will trigger the pain. In 1991 she had a ruptured disc and had a laminectomy. She doesn't recall the level of the disc. She has some residual issues with her left foot having a drop foot. She reports the pain goes down the front and down her lateral shin and both toes get numb. Triggers: sitting or sitting incorrectly, lifting incorrectly, and sleeping on her side. She has numbness in the toes. She notes this with sleeping. She has had this for several weeks now. She has not had recent imaging. She did have a dexa scan and is on Vit D and calcium. She denies any history of neuropathy. She denies any significant neck pain. In September she had a physical and labs were fine. She isn't sure if all vitamin levels were checked. She does not feel like she needs any medication for the pain she just wants to make sure there is no underlying structural issue going on. She has never had an EMG/NCS. She denies any tremor, falls, or numbness in the hands. She does have arthritis and takes Advil/Tylenol for this. Objective:  
ROS: 
Per HPI- 
Otherwise 12 point ROS was negative Meds: 
Current Outpatient Prescriptions on File Prior to Visit Medication Sig Dispense Refill  aspirin delayed-release (ADULT ASPIRIN REGIMEN) 81 mg tablet Take  by mouth.  diphenhydrAMINE (BENADRYL) 25 mg capsule Take  by mouth.  calcium citrate-vitamin d3 (CITRACAL+D) 315-200 mg-unit tab Take  by mouth.  multivit-min-FA-lycopen-lutein (CENTRUM SILVER) 0.4-300-250 mg-mcg-mcg tab Take  by mouth.  RESTASIS 0.05 % ophthalmic emulsion INSTILL 1 DROP IN BOTH EYES BID  3  
 estradiol (ESTRACE) 0.01 % (0.1 mg/gram) vaginal cream Insert  into vagina. Indications: sm amount externally  lovastatin (MEVACOR) 20 mg tablet Indications: pt. takes 1/2 tablet nightly  meclizine (ANTIVERT) 25 mg tablet Take  by mouth.  calcium carbonate (TUMS ULTRA) 400 mg calcium (1,000 mg) chew Take  by mouth.  acetaminophen (TYLENOL EXTRA STRENGTH) 500 mg tablet Take  by mouth.  ascorbic acid, vitamin C, (VITAMIN C) 250 mg tablet Take  by mouth.  cholecalciferol, vitamin D3, 2,000 unit tab Take  by mouth.  fluticasone (FLONASE ALLERGY RELIEF) 50 mcg/actuation nasal spray by Nasal route. No current facility-administered medications on file prior to visit. Imaging: MRI lumbar spine: Multiple levels of disc disease with moderate stenosis (I personally reviewed these images in PACS and this is my impression) EMG/NCS: 
Impression: ABNORMAL 
  
Extensive electrodiagnostic examination of the right upper and bilateral lower extremities shows the followin) Absent sensory responses in both lower extremities. Absent bilateral tibial motor responses. Prolonged fibular motor onset latencies and conduction slowing with amplitude drop bilaterally. Absent H-reflex and Prolonged F-responses. 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   
2) Possible superimposed right median neuropathy at or distal to the wrist, severe in degree electrically 
  
  
Reviewed records in [facility] and media tab today Lab Review No results found for this or any previous visit. Exam: 
Visit Vitals  /64  Pulse 72  Resp 16  
 Ht 5' 3\" (1.6 m)  Wt 64 kg (141 lb)  SpO2 95%  BMI 24.98 kg/m2 Gen: Well developed CV: RRR Lungs: non labored breathing Abd: non distending Neuro: A&O x 3, no dysarthria or aphasia CN II-XII: PERRL, EOMI, face symmetric, tongue/palate midline Motor: strength 5/5 all four ext Sensory: intact to LT Gait: normal 
 
Assessment:  
Dat Mayorga is a 81 y.o. female who presents for follow up of pain in the right low back/buttocks. She does have several areas of disc disease in the lumbar spine. However, EMG/NCS was more concerning for possible CIDP. Will do neuropathy labs today to evaluate further. Discussed with patient we may need to pursue a spinal tap for further evaluation and potential treatment for IVIG. If she does require treatment for CIDP we will have her follow with Dr. Suhail Dnag. Plan:  
 
  
  
Visit Diagnoses Polyneuropathy    -  Primary Relevant Orders · PROTEIN ELECTROPHORESIS  
 · VITAMIN B12 & FOLATE · VITAMIN D, 1, 25 DIHYDROXY · SED RATE (ESR) · MICHAEL COMPREHENSIVE PLUS PANEL  
 · HEMOGLOBIN A1C WITH EAG 
· If above testing is negative will proceed with spinal tap to evaluate for protein and albumin disassociation and initiate IVIG if necessary Acute low back pain without sciatica, unspecified back pain laterality · Likely secondary to her disc disease · Pain is improved at this point · Will not proceed with physical therapy at this time Carpal tunnel syndrome of right wrist     
· Will monitor FU TBD based on testing Signed: 
Minnie York MD 
6/18/2018 Medications and side effects discussed with patient in detail. With any new medications prescribed, patient was given instructions on administration and side effects. Written medication information was provided to the patient as well. This note was created using voice recognition software. Despite editing, there may be syntax errors. This note will not be viewable in [address]

## 2018-06-18 NOTE — PROGRESS NOTES
Neurology Progress Note    Patient ID:  Rosemary Jones  [id number]  81 y.o.  1945    HISTORY PROVIDED BY:  Patient  Family Member:       Chief Complaint: Numbness in the feet  Subjective:    Ms. Charlie Olivia is here for follow up today of numbness. She was having some really bad pain in her right buttocks but this is improved. Since our last visit she did have testing done. This included an MRI of the lumbar spine that show multiple areas of disc disease. Subsequently she had an EMG/NCS. This actually is more concerning for AIDP versus CIDP. Discussed with patient she has no prior history of Guillain-Barré syndrome or AIDP. She does have a history of previous laminectomy. Her EMG also showed right carpal tunnel that is severe and degree. Patient reports that her main issues she wants to make sure she does not have any underlying disease. We did discuss further workup that can be done. She did bring blood work with her today that she previously had. We will add to this. Currently patient denies any new focal numbness or weakness. No bowel or bladder incontinence    Recap:  Rosemary Jones is a 81 y.o. female who I am asked to see in consultation for pain in her right buttocks. This started for her several weeks ago. She notes that any activity pushing or pulling will trigger the pain. In 1991 she had a ruptured disc and had a laminectomy. She doesn't recall the level of the disc. She has some residual issues with her left foot having a drop foot. She reports the pain goes down the front and down her lateral shin and both toes get numb. Triggers: sitting or sitting incorrectly, lifting incorrectly, and sleeping on her side. She has numbness in the toes. She notes this with sleeping. She has had this for several weeks now. She has not had recent imaging. She did have a dexa scan and is on Vit D and calcium. She denies any history of neuropathy. She denies any significant neck pain.   In September she had a physical and labs were fine. She isn't sure if all vitamin levels were checked. She does not feel like she needs any medication for the pain she just wants to make sure there is no underlying structural issue going on. She has never had an EMG/NCS. She denies any tremor, falls, or numbness in the hands. She does have arthritis and takes Advil/Tylenol for this. Objective:   ROS:  Per HPI-  Otherwise 12 point ROS was negative    Meds:  Current Outpatient Prescriptions on File Prior to Visit   Medication Sig Dispense Refill    aspirin delayed-release (ADULT ASPIRIN REGIMEN) 81 mg tablet Take  by mouth.  diphenhydrAMINE (BENADRYL) 25 mg capsule Take  by mouth.  calcium citrate-vitamin d3 (CITRACAL+D) 315-200 mg-unit tab Take  by mouth.  multivit-min-FA-lycopen-lutein (CENTRUM SILVER) 0.4-300-250 mg-mcg-mcg tab Take  by mouth.  RESTASIS 0.05 % ophthalmic emulsion INSTILL 1 DROP IN BOTH EYES BID  3    estradiol (ESTRACE) 0.01 % (0.1 mg/gram) vaginal cream Insert  into vagina. Indications: sm amount externally      lovastatin (MEVACOR) 20 mg tablet Indications: pt. takes 1/2 tablet nightly      meclizine (ANTIVERT) 25 mg tablet Take  by mouth.  calcium carbonate (TUMS ULTRA) 400 mg calcium (1,000 mg) chew Take  by mouth.  acetaminophen (TYLENOL EXTRA STRENGTH) 500 mg tablet Take  by mouth.  ascorbic acid, vitamin C, (VITAMIN C) 250 mg tablet Take  by mouth.  cholecalciferol, vitamin D3, 2,000 unit tab Take  by mouth.  fluticasone (FLONASE ALLERGY RELIEF) 50 mcg/actuation nasal spray by Nasal route. No current facility-administered medications on file prior to visit.         Imaging:  MRI lumbar spine: Multiple levels of disc disease with moderate stenosis (I personally reviewed these images in PACS and this is my impression)      EMG/NCS:  Impression:  ABNORMAL     Extensive electrodiagnostic examination of the right upper and bilateral lower extremities shows the followin) Absent sensory responses in both lower extremities. Absent bilateral tibial motor responses. Prolonged fibular motor onset latencies and conduction slowing with amplitude drop bilaterally. Absent H-reflex and Prolonged F-responses. 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    2) Possible superimposed right median neuropathy at or distal to the wrist, severe in degree electrically        Reviewed records in [facility] and media tab today    Lab Review   No results found for this or any previous visit. Exam:  Visit Vitals    /64    Pulse 72    Resp 16    Ht 5' 3\" (1.6 m)    Wt 64 kg (141 lb)    SpO2 95%    BMI 24.98 kg/m2     Gen: Well developed  CV: RRR  Lungs: non labored breathing  Abd: non distending  Neuro: A&O x 3, no dysarthria or aphasia  CN II-XII: PERRL, EOMI, face symmetric, tongue/palate midline  Motor: strength 5/5 all four ext  Sensory: intact to LT  Gait: normal    Assessment:   Nate Torres is a 81 y.o. female who presents for follow up of pain in the right low back/buttocks. She does have several areas of disc disease in the lumbar spine. However, EMG/NCS was more concerning for possible CIDP. Will do neuropathy labs today to evaluate further. Discussed with patient we may need to pursue a spinal tap for further evaluation and potential treatment for IVIG. If she does require treatment for CIDP we will have her follow with Dr. Emily Ruff.      Plan:           Visit Diagnoses     Polyneuropathy    -  Primary    Relevant Orders    · PROTEIN ELECTROPHORESIS    · VITAMIN B12 & FOLATE    · VITAMIN D, 1, 25 DIHYDROXY    · SED RATE (ESR)    · MICHAEL COMPREHENSIVE PLUS PANEL    · HEMOGLOBIN A1C WITH EAG  · If above testing is negative will proceed with spinal tap to evaluate for protein and albumin disassociation and initiate IVIG if necessary    Acute low back pain without sciatica, unspecified back pain laterality      · Likely secondary to her disc disease  · Pain is improved at this point  · Will not proceed with physical therapy at this time    Carpal tunnel syndrome of right wrist      · Will monitor          FU TBD based on testing      Signed:  Jagjit Soto MD  6/18/2018    Medications and side effects discussed with patient in detail. With any new medications prescribed, patient was given instructions on administration and side effects. Written medication information was provided to the patient as well. This note was created using voice recognition software. Despite editing, there may be syntax errors. This note will not be viewable in [address]

## 2018-06-18 NOTE — MR AVS SNAPSHOT
[address] [address] 
[phone] Patient: Jayson Luz MRN: [id number] FYP:[birth year] Visit Information Date & Time Provider Department Dept. Phone Encounter #  
 6/18/2018  1:00 PM Lisy Mchugh MD [facility] [phone] [id number] Upcoming Health Maintenance Date Due Hepatitis C Screening 1945 DTaP/Tdap/Td series (1 - Tdap) 8/4/1968 BREAST CANCER SCRN MAMMOGRAM 8/4/1997 FOBT Q 1 YEAR AGE 50-75 8/4/1997 ZOSTER VACCINE AGE 60> 6/4/2007 GLAUCOMA SCREENING Q2Y 8/4/2012 Pneumococcal 65+ Low/Medium Risk (1 of 2 - PCV13) 8/4/2012 MEDICARE YEARLY EXAM 3/20/2018 Influenza Age 5 to Adult 8/1/2018 Allergies as of 6/18/2018  Review Complete On: 5/18/2018 By: Lisy Mchugh MD  
 No Known Allergies Current Immunizations  Never Reviewed No immunizations on file. Not reviewed this visit You Were Diagnosed With   
  
 Codes Comments Polyneuropathy    -  Primary ICD-10-CM: G62.9 ICD-9-CM: 356.9 Low vitamin D level     ICD-10-CM: E55.9 ICD-9-CM: 268.9 Paresthesia of both legs     ICD-10-CM: R20.2 ICD-9-CM: 782.0 Hyperglycemia     ICD-10-CM: R73.9 ICD-9-CM: 790.29 Vitals BP Pulse Resp Height(growth percentile) Weight(growth percentile) SpO2  
 130/64 72 16 5' 3\" (1.6 m) 141 lb (64 kg) 95% BMI  
  
  
  
  
 24.98 kg/m2 BMI and BSA Data Body Mass Index Body Surface Area 24.98 kg/m 2 1.69 m 2 Your Updated Medication List  
  
   
This list is accurate as of 6/18/18  1:40 PM.  Always use your most recent med list.  
  
  
  
  
 ADULT ASPIRIN REGIMEN 81 mg tablet Generic drug:  aspirin delayed-release Take  by mouth. ascorbic acid (vitamin C) 250 mg tablet Commonly known as:  VITAMIN C Take  by mouth. BENADRYL 25 mg capsule Generic drug:  diphenhydrAMINE Take  by mouth.  
  
 calcium citrate-vitamin d3 315-200 mg-unit Tab Commonly known as:  CITRACAL+D Take  by mouth. CENTRUM SILVER 0.4-300-250 mg-mcg-mcg Tab Generic drug:  multivit-min-FA-lycopen-lutein Take  by mouth. cholecalciferol (vitamin D3) 2,000 unit Tab Take  by mouth. ESTRACE 0.01 % (0.1 mg/gram) vaginal cream  
Generic drug:  estradiol Insert  into vagina. Indications: sm amount externally  
  
 lovastatin 20 mg tablet Commonly known as:  MEVACOR Indications: pt. takes 1/2 tablet nightly  
  
 meclizine 25 mg tablet Commonly known as:  ANTIVERT Take  by mouth. RESTASIS 0.05 % ophthalmic emulsion Generic drug:  cycloSPORINE INSTILL 1 DROP IN BOTH EYES BID  
  
 TUMS ULTRA 400 mg calcium (1,000 mg) Chew Generic drug:  calcium carbonate Take  by mouth. TYLENOL EXTRA STRENGTH 500 mg tablet Generic drug:  acetaminophen Take  by mouth. We Performed the Following MICHAEL COMPREHENSIVE PLUS PANEL [[id number] Custom] PROTEIN ELECTROPHORESIS [05741 CPT(R)] VITAMIN B12 & FOLATE [31750 CPT(R)] To-Do List   
 06/18/2018 Lab:  SED RATE (ESR)   
  
 06/19/2018 Lab:  HEMOGLOBIN A1C WITH EAG   
  
 06/19/2018 Lab:  VITAMIN D, 1, 25 DIHYDROXY Patient Instructions Teodoro Prasad 1728 What is a living will? A living will is a legal form you use to write down the kind of care you want at the end of your life. It is used by the health professionals who will treat you if you aren't able to decide for yourself. If you put your wishes in writing, your loved ones and others will know what kind of care you want. They won't need to guess. This can ease your mind and be helpful to others. A living will is not the same as an estate or property will. An estate will explains what you want to happen with your money and property after you die. Is a living will a legal document? A living will is a legal document. Each state has its own laws about living quiroz. If you move to another state, make sure that your living will is legal in the state where you now live. Or you might use a universal form that has been approved by many states. This kind of form can sometimes be completed and stored online. Your electronic copy will then be available wherever you have a connection to the Internet. In most cases, doctors will respect your wishes even if you have a form from a different state. · You don't need an  to complete a living will. But legal advice can be helpful if your state's laws are unclear, your health history is complicated, or your family can't agree on what should be in your living will. · You can change your living will at any time. Some people find that their wishes about end-of-life care change as their health changes. · In addition to making a living will, think about completing a medical power of  form. This form lets you name the person you want to make end-of-life treatment decisions for you (your \"health care agent\") if you're not able to. Many hospitals and nursing homes will give you the forms you need to complete a living will and a medical power of . · Your living will is used only if you can't make or communicate decisions for yourself anymore. If you become able to make decisions again, you can accept or refuse any treatment, no matter what you wrote in your living will. · Your state may offer an online registry. This is a place where you can store your living will online so the doctors and nurses who need to treat you can find it right away. What should you think about when creating a living will? Talk about your end-of-life wishes with your family members and your doctor. Let them know what you want. That way the people making decisions for you won't be surprised by your choices. Think about these questions as you make your living will: · Do you know enough about life support methods that might be used? If not, talk to your doctor so you know what might be done if you can't breathe on your own, your heart stops, or you're unable to swallow. · What things would you still want to be able to do after you receive life-support methods? Would you want to be able to walk? To speak? To eat on your own? To live without the help of machines? · If you have a choice, where do you want to be cared for? In your home? At a hospital or nursing home? · Do you want certain [employer] practices performed if you become very ill? · If you have a choice at the end of your life, where would you prefer to die? At home? In a hospital or nursing home? Somewhere else? · Would you prefer to be buried or cremated? · Do you want your organs to be donated after you die? What should you do with your living will? · Make sure that your family members and your health care agent have copies of your living will. · Give your doctor a copy of your living will to keep in your medical record. If you have more than one doctor, make sure that each one has a copy. · You may want to put a copy of your living will where it can be easily found. Where can you learn more? Go to http://cary-katty.info/. Enter H014 in the search box to learn more about \"Learning About Living Susy. \" Current as of: September 24, 2016 Content Version: 11.4 © 9312-1244 [employer]. Care instructions adapted under license by [employer] (which disclaims liability or warranty for this information). If you have questions about a medical condition or this instruction, always ask your healthcare professional. [place] [address] any warranty or liability for your use of this information. Advance Directives: Care Instructions Your Care Instructions An advance directive is a legal way to state your wishes at the end of your life. It tells your family and your doctor what to do if you can no longer say what you want. There are two main types of advance directives. You can change them any time that your wishes change. · A living will tells your family and your doctor your wishes about life support and other treatment. · A durable power of  for health care lets you name a person to make treatment decisions for you when you can't speak for yourself. This person is called a health care agent. If you do not have an advance directive, decisions about your medical care may be made by a doctor or a  who doesn't know you. It may help to think of an advance directive as a gift to the people who care for you. If you have one, they won't have to make tough decisions by themselves. Follow-up care is a key part of your treatment and safety. Be sure to make and go to all appointments, and call your doctor if you are having problems. It's also a good idea to know your test results and keep a list of the medicines you take. How can you care for yourself at home? · Discuss your wishes with your loved ones and your doctor. This way, there are no surprises. · Many states have a unique form. Or you might use a universal form that has been approved by many states. This kind of form can sometimes be completed and stored online. Your electronic copy will then be available wherever you have a connection to the Internet. In most cases, doctors will respect your wishes even if you have a form from a different state. · You don't need a  to do an advance directive. But you may want to get legal advice. · Think about these questions when you prepare an advance directive: ¨ Who do you want to make decisions about your medical care if you are not able to? Many people choose a family member or close friend. ¨ Do you know enough about life support methods that might be used? If not, talk to your doctor so you understand. ¨ What are you most afraid of that might happen? You might be afraid of having pain, losing your independence, or being kept alive by machines. ¨ Where would you prefer to die? Choices include your home, a hospital, or a nursing home. ¨ Would you like to have information about hospice care to support you and your family? ¨ Do you want to donate organs when you die? ¨ Do you want certain [employer] practices performed before you die? If so, put your wishes in the advance directive. · Read your advance directive every year, and make changes as needed. When should you call for help? Be sure to contact your doctor if you have any questions. Where can you learn more? Go to http://cary-katty.info/. Enter R264 in the search box to learn more about \"Advance Directives: Care Instructions. \" Current as of: September 24, 2016 Content Version: 11.4 © 6879-1456 [employer]. Care instructions adapted under license by [employer] (which disclaims liability or warranty for this information). If you have questions about a medical condition or this instruction, always ask your healthcare professional. [address] [place] [address] any warranty or liability for your use of this information. PRESCRIPTION REFILL POLICY [facility] Statement to Patients April 1, 2014 In an effort to ensure the large volume of patient prescription refills is processed in the most efficient and expeditious manner, we are asking our patients to assist us by calling your Pharmacy for all prescription refills, this will include also your  Mail Order Pharmacy. The pharmacy will contact our office electronically to continue the refill process. Please do not wait until the last minute to call your pharmacy. We need at least 48 hours (2days) to fill prescriptions.  We also encourage you to call your pharmacy before going to  your prescription to make sure it is ready. With regard to controlled substance prescription refill requests (narcotic refills) that need to be picked up at our office, we ask your cooperation by providing us with at least 72 hours (3days) notice that you will need a refill. We will not refill narcotic prescription refill requests after 4:00pm on any weekday, Monday through Thursday, or after 2:00pm on Fridays, or on the weekends. We encourage everyone to explore another way of getting your prescription refill request processed using [employer], our patient web portal through our electronic medical record system. [employer] is an efficient and effective way to communicate your medication request directly to the office and  downloadable as an maddi on your smart phone . [employer] also features a review functionality that allows you to view your medication list as well as leave messages for your physician. Are you ready to get connected? If so please review the attatched instructions or speak to any of our staff to get you set up right away! Thank you so much for your cooperation. Should you have any questions please contact our Practice Administrator. The Physicians and Staff,  [facility] Patient Instruction Plan/ Result Policy If we have ordered testing for you, know that; \"NO NEWS IS GOOD NEWS! \" It is our policy that we know longer call patients with results, nor do we  give test results over the phone. We schedule follow up appointments so that your results can be discussed in person. This allows you to address any questions you have regarding the results. If something of concern is revealed on your test, we will contact you to discuss the matter and if needed schedule a sooner follow up appointment.  
 Additionally, results may be found by using the My Chart feature and one of our patient service representatives at the  can give you instructions on how to access this feature to utilize our electronic medical record system. Thank you for your understanding. Introducing [facility]! Dear Yokasta Luis: Thank you for requesting a [employer] account. Our records indicate that you already have an active [employer] account. You can access your account anytime at [email or web address] Did you know that you can access your hospital and ER discharge instructions at any time in [employer]? You can also review all of your test results from your hospital stay or ER visit. Additional Information If you have questions, please visit the Frequently Asked Questions section of the [employer] website at [email or web address] Remember, [employer] is NOT to be used for urgent needs. For medical emergencies, dial 911. Now available from your iPhone and Android! Please provide this summary of care documentation to your next provider. Your primary care clinician is listed as Sharon Arceo. If you have any questions after today's visit, please call [phone].

## 2018-06-18 NOTE — PATIENT INSTRUCTIONS
Learning About Living Filomena Conley  What is a living will? A living will is a legal form you use to write down the kind of care you want at the end of your life. It is used by the health professionals who will treat you if you aren't able to decide for yourself. If you put your wishes in writing, your loved ones and others will know what kind of care you want. They won't need to guess. This can ease your mind and be helpful to others. A living will is not the same as an estate or property will. An estate will explains what you want to happen with your money and property after you die. Is a living will a legal document? A living will is a legal document. Each state has its own laws about living quiroz. If you move to another state, make sure that your living will is legal in the state where you now live. Or you might use a universal form that has been approved by many states. This kind of form can sometimes be completed and stored online. Your electronic copy will then be available wherever you have a connection to the Internet. In most cases, doctors will respect your wishes even if you have a form from a different state. · You don't need an  to complete a living will. But legal advice can be helpful if your state's laws are unclear, your health history is complicated, or your family can't agree on what should be in your living will. · You can change your living will at any time. Some people find that their wishes about end-of-life care change as their health changes. · In addition to making a living will, think about completing a medical power of  form. This form lets you name the person you want to make end-of-life treatment decisions for you (your \"health care agent\") if you're not able to. Many hospitals and nursing homes will give you the forms you need to complete a living will and a medical power of .   · Your living will is used only if you can't make or communicate decisions for yourself anymore. If you become able to make decisions again, you can accept or refuse any treatment, no matter what you wrote in your living will. · Your state may offer an online registry. This is a place where you can store your living will online so the doctors and nurses who need to treat you can find it right away. What should you think about when creating a living will? Talk about your end-of-life wishes with your family members and your doctor. Let them know what you want. That way the people making decisions for you won't be surprised by your choices. Think about these questions as you make your living will:  · Do you know enough about life support methods that might be used? If not, talk to your doctor so you know what might be done if you can't breathe on your own, your heart stops, or you're unable to swallow. · What things would you still want to be able to do after you receive life-support methods? Would you want to be able to walk? To speak? To eat on your own? To live without the help of machines? · If you have a choice, where do you want to be cared for? In your home? At a hospital or nursing home? · Do you want certain Hoahaoism practices performed if you become very ill? · If you have a choice at the end of your life, where would you prefer to die? At home? In a hospital or nursing home? Somewhere else? · Would you prefer to be buried or cremated? · Do you want your organs to be donated after you die? What should you do with your living will? · Make sure that your family members and your health care agent have copies of your living will. · Give your doctor a copy of your living will to keep in your medical record. If you have more than one doctor, make sure that each one has a copy. · You may want to put a copy of your living will where it can be easily found. Where can you learn more? Go to http://cary-katty.info/.   Enter Q933 in the search box to learn more about \"Learning About Living Evelio Rubio. \"  Current as of: September 24, 2016  Content Version: 11.4  © 7194-8121 [employer]. Care instructions adapted under license by [employer] (which disclaims liability or warranty for this information). If you have questions about a medical condition or this instruction, always ask your healthcare professional. [address] any warranty or liability for your use of this information. Advance Directives: Care Instructions  Your Care Instructions  An advance directive is a legal way to state your wishes at the end of your life. It tells your family and your doctor what to do if you can no longer say what you want. There are two main types of advance directives. You can change them any time that your wishes change. · A living will tells your family and your doctor your wishes about life support and other treatment. · A durable power of  for health care lets you name a person to make treatment decisions for you when you can't speak for yourself. This person is called a health care agent. If you do not have an advance directive, decisions about your medical care may be made by a doctor or a  who doesn't know you. It may help to think of an advance directive as a gift to the people who care for you. If you have one, they won't have to make tough decisions by themselves. Follow-up care is a key part of your treatment and safety. Be sure to make and go to all appointments, and call your doctor if you are having problems. It's also a good idea to know your test results and keep a list of the medicines you take. How can you care for yourself at home? · Discuss your wishes with your loved ones and your doctor. This way, there are no surprises. · Many states have a unique form. Or you might use a universal form that has been approved by many states. This kind of form can sometimes be completed and stored online.  Your electronic copy will then be available wherever you have a connection to the Internet. In most cases, doctors will respect your wishes even if you have a form from a different state. · You don't need a  to do an advance directive. But you may want to get legal advice. · Think about these questions when you prepare an advance directive:  ¨ Who do you want to make decisions about your medical care if you are not able to? Many people choose a family member or close friend. ¨ Do you know enough about life support methods that might be used? If not, talk to your doctor so you understand. ¨ What are you most afraid of that might happen? You might be afraid of having pain, losing your independence, or being kept alive by machines. ¨ Where would you prefer to die? Choices include your home, a hospital, or a nursing home. ¨ Would you like to have information about hospice care to support you and your family? ¨ Do you want to donate organs when you die? ¨ Do you want certain Yarsanism practices performed before you die? If so, put your wishes in the advance directive. · Read your advance directive every year, and make changes as needed. When should you call for help? Be sure to contact your doctor if you have any questions. Where can you learn more? Go to http://cary-katty.info/. Enter R264 in the search box to learn more about \"Advance Directives: Care Instructions. \"  Current as of: September 24, 2016  Content Version: 11.4  © 5850-4016 [employer]. Care instructions adapted under license by [employer] (which disclaims liability or warranty for this information). If you have questions about a medical condition or this instruction, always ask your healthcare professional. [place] [address] any warranty or liability for your use of this information.   [facility]   Statement to Patients  April 1, 2014      In an effort to ensure the large volume of patient prescription refills is processed in the most efficient and expeditious manner, we are asking our patients to assist us by calling your Pharmacy for all prescription refills, this will include also your  Mail Order Pharmacy. The pharmacy will contact our office electronically to continue the refill process. Please do not wait until the last minute to call your pharmacy. We need at least 48 hours (2days) to fill prescriptions. We also encourage you to call your pharmacy before going to  your prescription to make sure it is ready. With regard to controlled substance prescription refill requests (narcotic refills) that need to be picked up at our office, we ask your cooperation by providing us with at least 72 hours (3days) notice that you will need a refill. We will not refill narcotic prescription refill requests after 4:00pm on any weekday, Monday through Thursday, or after 2:00pm on Fridays, or on the weekends. We encourage everyone to explore another way of getting your prescription refill request processed using [employer], our patient web portal through our electronic medical record system. [employer] is an efficient and effective way to communicate your medication request directly to the office and  downloadable as an maddi on your smart phone . [employer] also features a review functionality that allows you to view your medication list as well as leave messages for your physician. Are you ready to get connected? If so please review the attatched instructions or speak to any of our staff to get you set up right away! Thank you so much for your cooperation. Should you have any questions please contact our Practice Administrator. The Physicians and Staff,  [facility]   Patient Instruction Plan/ Result Policy    If we have ordered testing for you, know that; [facility] NEWS IS GOOD NEWS! \" It is our policy that we know longer call patients with results, nor do we  give test results over the phone. We schedule follow up appointments so that your results can be discussed in person. This allows you to address any questions you have regarding the results. If something of concern is revealed on your test, we will contact you to discuss the matter and if needed schedule a sooner follow up appointment. Additionally, results may be found by using the My Chart feature and one of our patient service representatives at the  can give you instructions on how to access this feature to utilize our electronic medical record system. Thank you for your understanding.

## 2018-06-19 DIAGNOSIS — R20.2 PARESTHESIA OF BOTH LEGS: ICD-10-CM

## 2018-06-19 DIAGNOSIS — R79.89 LOW VITAMIN D LEVEL: ICD-10-CM

## 2018-06-19 DIAGNOSIS — R73.9 HYPERGLYCEMIA: ICD-10-CM

## 2018-06-19 DIAGNOSIS — G62.9 POLYNEUROPATHY: ICD-10-CM

## 2018-06-19 LAB
1,25(OH)2D3 SERPL-MCNC: 28.3 PG/ML (ref 19.9–79.3)
ERYTHROCYTE [SEDIMENTATION RATE] IN BLOOD BY WESTERGREN METHOD: 15 MM/HR (ref 0–40)
EST. AVERAGE GLUCOSE BLD GHB EST-MCNC: 120 MG/DL
FOLATE SERPL-MCNC: >20 NG/ML
HBA1C MFR BLD: 5.8 % (ref 4.8–5.6)
VIT B12 SERPL-MCNC: 960 PG/ML (ref 232–1245)

## 2018-06-19 NOTE — PROGRESS NOTES
Please call patient and let them know their vitamin D level was low. I want them to start taking Vitamin D 50,000 units weekly for 1 month and then 1000 units daily after this. Please send in an rx to the pharmacy of their choice for Vit D 50,000 units q 7 days with 4 tabs.

## 2018-06-22 ENCOUNTER — TELEPHONE (OUTPATIENT)
Dept: NEUROLOGY | Age: 73
End: 2018-06-22

## 2018-06-22 DIAGNOSIS — E55.9 VITAMIN D DEFICIENCY: Primary | ICD-10-CM

## 2018-06-22 RX ORDER — ERGOCALCIFEROL 1.25 MG/1
50000 CAPSULE ORAL
Qty: 4 CAP | Refills: 0 | Status: SHIPPED | OUTPATIENT
Start: 2018-06-22 | End: 2018-10-04

## 2018-06-22 NOTE — TELEPHONE ENCOUNTER
----- Message from Neita Spatz, LPN sent at 0/39/8071  8:33 AM EDT -----  Regarding: FW:Margo After Visit Follow-Up Message. Contact: [phone]      ----- Message -----     From: Brayan Beckham     Sent: 6/21/2018   6:32 AM       To: Leonor Torres  Subject: Heather Tripathi After Visit Follow-Up Message. Dr. Blank Hanson,  I'm following up on your note saying that your nurse would be calling about taking supplements for Vitamin D. Since I have not heard yet, I wanted to let you know that I currently am taking a supplement of 2000 IU of Vitamin D3, and there are 1000 IU of Vitamin D1 in my multivitamin, as well as 1000 IU of Vitamin D in my calcium supplement. Will you be prescribing something, such as Metformin, for the pre-diabetes? Your previous note indicated that you would get the correct codes for the tests that were declined because Medicare would not cover the them. Apparently, that has been done as I received results for all 4. Hopefully, they are now covered. I will look forward to your reply to this message. Brayan Beckham  ----- Message -----  From: Estephania Fernandez MD  Sent: 6/21/2018 12:00 AM EDT  To: Brayan Beckham  Subject: Margo After Visit Follow-Up Message. Pelon Ms. De,    Thank you for your recent visit to [address] [place] [address] [facility]. Your health is important to us and we are privileged to be your healthcare provider and partner. If you have follow-up questions regarding your treatment plan from todays visit, please contact us through the [employer] Patient Portal by replying to this message. In the near future, you may receive a survey about your experience with us. We hope you will take the time to let us know how we did so we can continue to improve the care you receive.       Thank you,    [address] [place] [address] [facility]

## 2018-06-22 NOTE — TELEPHONE ENCOUNTER
----- Message from Kim Bermudez sent at 6/22/2018 12:48 PM EDT -----  Regarding: Dr. Hong/ telephone  The pt is requesting the nurse give her a callback about her vitamin d. Best contact number is [phone].

## 2018-06-22 NOTE — TELEPHONE ENCOUNTER
Order placed for Vit D,4 tablets take I tablet once weekly, PO, per Verbal Order from Dr. Shirley Coyne on 6/22/2018 due to Vit D Deficiency.

## 2018-06-22 NOTE — TELEPHONE ENCOUNTER
Discussed with patient per Dr. Ese Dukes note. Patient stated understanding and gave me the pharmacy of preference.

## 2018-08-03 ENCOUNTER — TELEPHONE (OUTPATIENT)
Dept: NEUROLOGY | Age: 73
End: 2018-08-03

## 2018-08-03 NOTE — TELEPHONE ENCOUNTER
----- Message from Pilar Costello sent at 8/3/2018 11:10 AM EDT -----  Regarding: JOSE ELIAS Cole/Telephone  Pt is calling to speak with Santy Guzman regarding an e-mail she received. [phone].

## 2018-10-04 ENCOUNTER — OFFICE VISIT (OUTPATIENT)
Dept: NEUROLOGY | Age: 73
End: 2018-10-04

## 2018-10-04 VITALS — HEART RATE: 70 BPM | SYSTOLIC BLOOD PRESSURE: 134 MMHG | OXYGEN SATURATION: 99 % | DIASTOLIC BLOOD PRESSURE: 70 MMHG

## 2018-10-04 DIAGNOSIS — M21.372 LEFT FOOT DROP: ICD-10-CM

## 2018-10-04 DIAGNOSIS — G62.9 POLYNEUROPATHY: Primary | ICD-10-CM

## 2018-10-04 DIAGNOSIS — E55.9 VITAMIN D DEFICIENCY: ICD-10-CM

## 2018-10-04 NOTE — PATIENT INSTRUCTIONS
PRESCRIPTION REFILL POLICY [facility] Statement to Patients April 1, 2014 In an effort to ensure the large volume of patient prescription refills is processed in the most efficient and expeditious manner, we are asking our patients to assist us by calling your Pharmacy for all prescription refills, this will include also your  Mail Order Pharmacy. The pharmacy will contact our office electronically to continue the refill process. Please do not wait until the last minute to call your pharmacy. We need at least 48 hours (2days) to fill prescriptions. We also encourage you to call your pharmacy before going to  your prescription to make sure it is ready. With regard to controlled substance prescription refill requests (narcotic refills) that need to be picked up at our office, we ask your cooperation by providing us with at least 72 hours (3days) notice that you will need a refill. We will not refill narcotic prescription refill requests after 4:00pm on any weekday, Monday through Thursday, or after 2:00pm on Fridays, or on the weekends. We encourage everyone to explore another way of getting your prescription refill request processed using [email or web address], our patient web portal through our electronic medical record system. [email or web address] is an efficient and effective way to communicate your medication request directly to the office and  downloadable as an maddi on your smart phone . [email or web address] also features a review functionality that allows you to view your medication list as well as leave messages for your physician. Are you ready to get connected? If so please review the attatched instructions or speak to any of our staff to get you set up right away! Thank you so much for your cooperation. Should you have any questions please contact our Practice Administrator. The Physicians and Staff,  [facility] Patient Instruction Plan/ Result Policy If we have ordered testing for you, know that; \"NO NEWS IS GOOD NEWS! \" It is our policy that we know longer call patients with results, nor do we  give test results over the phone. We schedule follow up appointments so that your results can be discussed in person. This allows you to address any questions you have regarding the results. If something of concern is revealed on your test, we will contact you to discuss the matter and if needed schedule a sooner follow up appointment. Additionally, results may be found by using the My Chart feature and one of our patient service representatives at the  can give you instructions on how to access this feature to utilize our electronic medical record system. Thank you for your understanding.

## 2018-10-04 NOTE — LETTER
Neurology Progress Note Patient ID: 
Dae Height [id number] 
72 y.o. 
1945 HISTORY PROVIDED BY: 
Patient Family Member:  Chief Complaint: Numbness in the feet Subjective:  
 Ms. Sandeep Rosas is here for follow up today of numbness. She did have EMG/NCS that was concerning for possible AI DP versus CIDP. She also had neuropathy labs. Her vitamin D was low but now is repleted and she is on supplements now. She has some weakness on the left leg for years. She has to pick it up at times. She can't pull the left foot up. She has arthritis so thinks this could contribute. She has some numbness and tingling in the feet. The pain is better. When she is active she is fine. She is walking 30 minutes every day on a treadmill. She does have newly diagnosed pre DM and is on diet modification with high fiber for this. She reports some back issues and numbness when sitting to long. She has good BP readings. Recap: She was having some really bad pain in her right buttocks but this is improved. Since our last visit she did have testing done. This included an MRI of the lumbar spine that show multiple areas of disc disease. Subsequently she had an EMG/NCS. This actually is more concerning for AIDP versus CIDP. Discussed with patient she has no prior history of Guillain-Barré syndrome or AIDP. She does have a history of previous laminectomy. Her EMG also showed right carpal tunnel that is severe and degree. Patient reports that her main issues she wants to make sure she does not have any underlying disease. We did discuss further workup that can be done. She did bring blood work with her today that she previously had. We will add to this. Currently patient denies any new focal numbness or weakness. No bowel or bladder incontinence Objective:  
ROS: 
Per HPI- 
Otherwise 12 point ROS was negative Meds: 
Current Outpatient Prescriptions on File Prior to Visit Medication Sig Dispense Refill  aspirin delayed-release (ADULT ASPIRIN REGIMEN) 81 mg tablet Take  by mouth.  diphenhydrAMINE (BENADRYL) 25 mg capsule Take  by mouth.  multivit-min-FA-lycopen-lutein (CENTRUM SILVER) 0.4-300-250 mg-mcg-mcg tab Take  by mouth.  RESTASIS 0.05 % ophthalmic emulsion INSTILL 1 DROP IN BOTH EYES BID  3  
 estradiol (ESTRACE) 0.01 % (0.1 mg/gram) vaginal cream Insert  into vagina. Indications: sm amount externally  lovastatin (MEVACOR) 20 mg tablet Indications: pt. takes 1/2 tablet nightly  meclizine (ANTIVERT) 25 mg tablet Take  by mouth.  calcium carbonate (TUMS ULTRA) 400 mg calcium (1,000 mg) chew Take  by mouth.  ascorbic acid, vitamin C, (VITAMIN C) 250 mg tablet Take  by mouth.  cholecalciferol, vitamin D3, 2,000 unit tab Take  by mouth.  ergocalciferol (ERGOCALCIFEROL) 50,000 unit capsule Take 1 Cap by mouth every seven (7) days. 4 Cap 0  
 calcium citrate-vitamin d3 (CITRACAL+D) 315-200 mg-unit tab Take  by mouth.  acetaminophen (TYLENOL EXTRA STRENGTH) 500 mg tablet Take  by mouth. No current facility-administered medications on file prior to visit. Imaging: MRI lumbar spine: Multiple levels of disc disease with moderate stenosis (I personally reviewed these images in PACS and this is my impression) EMG/NCS: 
Impression: ABNORMAL 
  
Extensive electrodiagnostic examination of the right upper and bilateral lower extremities shows the followin) Absent sensory responses in both lower extremities. Absent bilateral tibial motor responses. Prolonged fibular motor onset latencies and conduction slowing with amplitude drop bilaterally. Absent H-reflex and Prolonged F-responses.  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 
  
 2) Possible superimposed right median neuropathy at or distal to the wrist, severe in degree electrically 
  
  
Reviewed records in [facility] and media tab today Lab Review Results for orders placed or performed in visit on 06/18/18 VITAMIN B12 & FOLATE Result Value Ref Range Vitamin B12 960 232 - 1245 pg/mL Folate >20.0 >3.0 ng/mL HEMOGLOBIN A1C WITH EAG Result Value Ref Range Hemoglobin A1c 5.8 (H) 4.8 - 5.6 % Estimated average glucose 120 mg/dL VITAMIN D, 1, 25 DIHYDROXY Result Value Ref Range Calcitriol (Vit D 1, 25 di-OH) 28.3 19.9 - 79.3 pg/mL SED RATE (ESR) Result Value Ref Range Sed rate (ESR) 15 0 - 40 mm/hr Exam: 
Visit Vitals  /70 (BP 1 Location: Left arm, BP Patient Position: Sitting)  Pulse 70  Resp (P) 16  
 Ht (P) 5' 3\" (1.6 m)  Wt (P) 61.8 kg (136 lb 3.2 oz)  SpO2 99%  BMI (P) 24.13 kg/m2 Gen: Well developed CV: RRR Lungs: non labored breathing Abd: non distending Neuro: A&O x 3, no dysarthria or aphasia CN II-XII: PERRL, EOMI, face symmetric, tongue/palate midline Motor: strength 5/5 in upper extremities and right lower extremity, 5-/5 in left lower extremity with 0/5 TA Sensory: intact to LT Gait: Hemiparetic due to left foot drop Assessment:  
Renee Mckeon is a 72 y.o. female who presents for follow up of pain in the right low back/buttocks. She does have several areas of disc disease in the lumbar spine. However, EMG/NCS was more concerning for possible CIDP. Neuropathy labs revealed prediabetes and vitamin D deficiency. Patient feels that she has improved in terms of her pain by addressing these issues. She continues with a left foot drop which is her baseline. At this point she does not want to pursue further evaluation for possible IVIG treatment. We discussed meeting with Dr. Hui Menjivar in the future if she wants to discuss this is a possibility. Plan:  
 
  
  
Visit Diagnoses Polyneuropathy    -  Primary Relevant Orders · PROTEIN ELECTROPHORESIS  
 · VITAMIN B12 & FOLATE · VITAMIN D, 1, 25 DIHYDROXY with significant deficiencyimproved on supplements · SED RATE (ESR) · MICHAEL COMPREHENSIVE PLUS PANEL  
 · HEMOGLOBIN A1C WITH EAG with pre-DM · Discussed IVIG and further workup but will hold off on this at this time Acute low back pain without sciatica, unspecified back pain laterality · Likely secondary to her disc disease · Pain is improved at this point · Will not proceed with physical therapy at this time Carpal tunnel syndrome of right wrist     
· Will monitor FU 3 months Signed: 
Virgilio Lebron MD 
10/4/2018 Medications and side effects discussed with patient in detail. With any new medications prescribed, patient was given instructions on administration and side effects. Written medication information was provided to the patient as well. This note was created using voice recognition software. Despite editing, there may be syntax errors. This note will not be viewable in [address]

## 2018-10-04 NOTE — MR AVS SNAPSHOT
[address] [place] [address] [address] [place] [address] [phone] Patient: Hamida Jarrell MRN: [id number] QPP:[id number] Visit Information Date & Time Provider Department Dept. Phone Encounter #  
 10/4/2018  9:40 AM Brenda Burger MD [facility] [phone] [id number] Follow-up Instructions Return in about 4 months (around 2/4/2019). Upcoming Health Maintenance Date Due Hepatitis C Screening 1945 DTaP/Tdap/Td series (1 - Tdap) 8/4/1968 Shingrix Vaccine Age 50> (1 of 2) 8/4/1997 BREAST CANCER SCRN MAMMOGRAM 8/4/1997 FOBT Q 1 YEAR AGE 50-75 8/4/1997 GLAUCOMA SCREENING Q2Y 8/4/2012 Pneumococcal 65+ Low/Medium Risk (1 of 2 - PCV13) 8/4/2012 MEDICARE YEARLY EXAM 3/20/2018 Influenza Age 5 to Adult 8/1/2018 Allergies as of 10/4/2018  Review Complete On: 10/4/2018 By: Sakina Smalls No Known Allergies Current Immunizations  Never Reviewed No immunizations on file. Not reviewed this visit Vitals BP Pulse Resp Height(growth percentile) Weight(growth percentile) SpO2  
 134/70 (BP 1 Location: Left arm, BP Patient Position: Sitting) 70 (P) 16 (P) 5' 3\" (1.6 m) (P) 136 lb 3.2 oz (61.8 kg) 99% BMI Smoking Status (P) 24.13 kg/m2 Never Smoker Vitals History BMI and BSA Data Body Mass Index Body Surface Area (P) 24.13 kg/m 2 (P) 1.66 m 2 Preferred Pharmacy Pharmacy Name Phone [facility] [phone], [address]oder [phone] AT [facility] OF  [facility] [phone] Your Updated Medication List  
  
   
This list is accurate as of 10/4/18 10:25 AM.  Always use your most recent med list.  
  
  
  
  
 ADULT ASPIRIN REGIMEN 81 mg tablet Generic drug:  aspirin delayed-release Take  by mouth. ascorbic acid (vitamin C) 250 mg tablet Commonly known as:  VITAMIN C Take  by mouth. BENADRYL 25 mg capsule Generic drug:  diphenhydrAMINE Take  by mouth.  
  
 calcium citrate-vitamin d3 315-200 mg-unit Tab Commonly known as:  CITRACAL+D Take  by mouth. CENTRUM SILVER 0.4-300-250 mg-mcg-mcg Tab Generic drug:  multivit-min-FA-lycopen-lutein Take  by mouth. cholecalciferol (vitamin D3) 2,000 unit Tab Take  by mouth.  
  
 ergocalciferol 50,000 unit capsule Commonly known as:  ERGOCALCIFEROL Take 1 Cap by mouth every seven (7) days. ESTRACE 0.01 % (0.1 mg/gram) vaginal cream  
Generic drug:  estradiol Insert  into vagina. Indications: sm amount externally  
  
 lovastatin 20 mg tablet Commonly known as:  MEVACOR Indications: pt. takes 1/2 tablet nightly  
  
 meclizine 25 mg tablet Commonly known as:  ANTIVERT Take  by mouth. RESTASIS 0.05 % ophthalmic emulsion Generic drug:  cycloSPORINE INSTILL 1 DROP IN BOTH EYES BID  
  
 TUMS ULTRA 400 mg calcium (1,000 mg) Chew Generic drug:  calcium carbonate Take  by mouth. TYLENOL EXTRA STRENGTH 500 mg tablet Generic drug:  acetaminophen Take  by mouth. Follow-up Instructions Return in about 4 months (around 2/4/2019). Patient Instructions PRESCRIPTION REFILL POLICY [facility] Statement to Patients April 1, 2014 In an effort to ensure the large volume of patient prescription refills is processed in the most efficient and expeditious manner, we are asking our patients to assist us by calling your Pharmacy for all prescription refills, this will include also your  Mail Order Pharmacy. The pharmacy will contact our office electronically to continue the refill process. Please do not wait until the last minute to call your pharmacy. We need at least 48 hours (2days) to fill prescriptions.  We also encourage you to call your pharmacy before going to  your prescription to make sure it is ready. With regard to controlled substance prescription refill requests (narcotic refills) that need to be picked up at our office, we ask your cooperation by providing us with at least 72 hours (3days) notice that you will need a refill. We will not refill narcotic prescription refill requests after 4:00pm on any weekday, Monday through Thursday, or after 2:00pm on Fridays, or on the weekends. We encourage everyone to explore another way of getting your prescription refill request processed using [employer], our patient web portal through our electronic medical record system. [employer] is an efficient and effective way to communicate your medication request directly to the office and  downloadable as an maddi on your smart phone . [employer] also features a review functionality that allows you to view your medication list as well as leave messages for your physician. Are you ready to get connected? If so please review the attatched instructions or speak to any of our staff to get you set up right away! Thank you so much for your cooperation. Should you have any questions please contact our Practice Administrator. The Physicians and Staff,  [facility] Patient Instruction Plan/ Result Policy If we have ordered testing for you, know that; \"NO NEWS IS GOOD NEWS! \" It is our policy that we know longer call patients with results, nor do we  give test results over the phone. We schedule follow up appointments so that your results can be discussed in person. This allows you to address any questions you have regarding the results. If something of concern is revealed on your test, we will contact you to discuss the matter and if needed schedule a sooner follow up appointment.  
 Additionally, results may be found by using the My Chart feature and one of our patient service representatives at the  can give you instructions on how to access this feature to utilize our electronic medical record system. Thank you for your understanding. Introducing [facility] SERVICES! Dear Warden Bonilla: Thank you for requesting a [employer] account. Our records indicate that you already have an active [employer] account. You can access your account anytime at [email or web address] Did you know that you can access your hospital and ER discharge instructions at any time in [employer]? You can also review all of your test results from your hospital stay or ER visit. Additional Information If you have questions, please visit the Frequently Asked Questions section of the [employer] website at [email or web address] Remember, [employer] is NOT to be used for urgent needs. For medical emergencies, dial 911. Now available from your iPhone and Android! Please provide this summary of care documentation to your next provider. Your primary care clinician is listed as Benjamin Dow. If you have any questions after today's visit, please call [phone].

## 2018-10-04 NOTE — PROGRESS NOTES
Neurology Progress Note Patient ID: 
Asael Agee [id number] 
72 y.o. 
1945 HISTORY PROVIDED BY: 
Patient Family Member:  Chief Complaint: Numbness in the feet Subjective:  
 Ms. Emmett Espitia is here for follow up today of numbness. She did have EMG/NCS that was concerning for possible AI DP versus CIDP. She also had neuropathy labs. Her vitamin D was low but now is repleted and she is on supplements now. She has some weakness on the left leg for years. She has to pick it up at times. She can't pull the left foot up. She has arthritis so thinks this could contribute. She has some numbness and tingling in the feet. The pain is better. When she is active she is fine. She is walking 30 minutes every day on a treadmill. She does have newly diagnosed pre DM and is on diet modification with high fiber for this. She reports some back issues and numbness when sitting to long. She has good BP readings. Recap: She was having some really bad pain in her right buttocks but this is improved. Since our last visit she did have testing done. This included an MRI of the lumbar spine that show multiple areas of disc disease. Subsequently she had an EMG/NCS. This actually is more concerning for AIDP versus CIDP. Discussed with patient she has no prior history of Guillain-Barré syndrome or AIDP. She does have a history of previous laminectomy. Her EMG also showed right carpal tunnel that is severe and degree. Patient reports that her main issues she wants to make sure she does not have any underlying disease. We did discuss further workup that can be done. She did bring blood work with her today that she previously had. We will add to this. Currently patient denies any new focal numbness or weakness. No bowel or bladder incontinence Objective:  
ROS: 
Per HPI- 
Otherwise 12 point ROS was negative Meds: 
Current Outpatient Prescriptions on File Prior to Visit Medication Sig Dispense Refill  aspirin delayed-release (ADULT ASPIRIN REGIMEN) 81 mg tablet Take  by mouth.  diphenhydrAMINE (BENADRYL) 25 mg capsule Take  by mouth.  multivit-min-FA-lycopen-lutein (CENTRUM SILVER) 0.4-300-250 mg-mcg-mcg tab Take  by mouth.  RESTASIS 0.05 % ophthalmic emulsion INSTILL 1 DROP IN BOTH EYES BID  3  
 estradiol (ESTRACE) 0.01 % (0.1 mg/gram) vaginal cream Insert  into vagina. Indications: sm amount externally  lovastatin (MEVACOR) 20 mg tablet Indications: pt. takes 1/2 tablet nightly  meclizine (ANTIVERT) 25 mg tablet Take  by mouth.  calcium carbonate (TUMS ULTRA) 400 mg calcium (1,000 mg) chew Take  by mouth.  ascorbic acid, vitamin C, (VITAMIN C) 250 mg tablet Take  by mouth.  cholecalciferol, vitamin D3, 2,000 unit tab Take  by mouth.  ergocalciferol (ERGOCALCIFEROL) 50,000 unit capsule Take 1 Cap by mouth every seven (7) days. 4 Cap 0  
 calcium citrate-vitamin d3 (CITRACAL+D) 315-200 mg-unit tab Take  by mouth.  acetaminophen (TYLENOL EXTRA STRENGTH) 500 mg tablet Take  by mouth. No current facility-administered medications on file prior to visit. Imaging: MRI lumbar spine: Multiple levels of disc disease with moderate stenosis (I personally reviewed these images in PACS and this is my impression) EMG/NCS: 
Impression: ABNORMAL 
  
Extensive electrodiagnostic examination of the right upper and bilateral lower extremities shows the followin) Absent sensory responses in both lower extremities. Absent bilateral tibial motor responses. Prolonged fibular motor onset latencies and conduction slowing with amplitude drop bilaterally. Absent H-reflex and Prolonged F-responses.  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 
  
 2) Possible superimposed right median neuropathy at or distal to the wrist, severe in degree electrically 
  
  
Reviewed records in [employer] and media tab today Lab Review Results for orders placed or performed in visit on 06/18/18 VITAMIN B12 & FOLATE Result Value Ref Range Vitamin B12 960 232 - 1245 pg/mL Folate >20.0 >3.0 ng/mL HEMOGLOBIN A1C WITH EAG Result Value Ref Range Hemoglobin A1c 5.8 (H) 4.8 - 5.6 % Estimated average glucose 120 mg/dL VITAMIN D, 1, 25 DIHYDROXY Result Value Ref Range Calcitriol (Vit D 1, 25 di-OH) 28.3 19.9 - 79.3 pg/mL SED RATE (ESR) Result Value Ref Range Sed rate (ESR) 15 0 - 40 mm/hr Exam: 
Visit Vitals  /70 (BP 1 Location: Left arm, BP Patient Position: Sitting)  Pulse 70  Resp (P) 16  
 Ht (P) 5' 3\" (1.6 m)  Wt (P) 61.8 kg (136 lb 3.2 oz)  SpO2 99%  BMI (P) 24.13 kg/m2 Gen: Well developed CV: RRR Lungs: non labored breathing Abd: non distending Neuro: A&O x 3, no dysarthria or aphasia CN II-XII: PERRL, EOMI, face symmetric, tongue/palate midline Motor: strength 5/5 in upper extremities and right lower extremity, 5-/5 in left lower extremity with 0/5 TA Sensory: intact to LT Gait: Hemiparetic due to left foot drop Assessment:  
Kiran Lester is a 72 y.o. female who presents for follow up of pain in the right low back/buttocks. She does have several areas of disc disease in the lumbar spine. However, EMG/NCS was more concerning for possible CIDP. Neuropathy labs revealed prediabetes and vitamin D deficiency. Patient feels that she has improved in terms of her pain by addressing these issues. She continues with a left foot drop which is her baseline. At this point she does not want to pursue further evaluation for possible IVIG treatment. We discussed meeting with Dr. Heriberto Ojeda in the future if she wants to discuss this is a possibility. Plan:  
 
  
  
Visit Diagnoses Polyneuropathy    -  Primary Relevant Orders · PROTEIN ELECTROPHORESIS  
 · VITAMIN B12 & FOLATE · VITAMIN D, 1, 25 DIHYDROXY with significant deficiencyimproved on supplements · SED RATE (ESR) · MICHAEL COMPREHENSIVE PLUS PANEL  
 · HEMOGLOBIN A1C WITH EAG with pre-DM · Discussed IVIG and further workup but will hold off on this at this time Acute low back pain without sciatica, unspecified back pain laterality · Likely secondary to her disc disease · Pain is improved at this point · Will not proceed with physical therapy at this time Carpal tunnel syndrome of right wrist     
· Will monitor FU 3 months Signed: 
Jeannine Morgan MD 
10/4/2018 Medications and side effects discussed with patient in detail. With any new medications prescribed, patient was given instructions on administration and side effects. Written medication information was provided to the patient as well. This note was created using voice recognition software. Despite editing, there may be syntax errors. This note will not be viewable in [address]

## 2019-05-22 ENCOUNTER — OFFICE VISIT (OUTPATIENT)
Dept: NEUROLOGY | Age: 74
End: 2019-05-22

## 2019-05-22 VITALS
SYSTOLIC BLOOD PRESSURE: 136 MMHG | BODY MASS INDEX: 24.98 KG/M2 | HEIGHT: 63 IN | OXYGEN SATURATION: 98 % | HEART RATE: 61 BPM | RESPIRATION RATE: 20 BRPM | DIASTOLIC BLOOD PRESSURE: 68 MMHG

## 2019-05-22 DIAGNOSIS — G62.9 POLYNEUROPATHY: ICD-10-CM

## 2019-05-22 DIAGNOSIS — G61.81 CIDP (CHRONIC INFLAMMATORY DEMYELINATING POLYNEUROPATHY) (HCC): Primary | ICD-10-CM

## 2019-05-22 NOTE — PROGRESS NOTES
Polyneuropathy- not any worse, feels some better     Occasional pain in the middle of her right buttocks that goes down the back of her right leg     Tingling in her legs had gotten worse, but she increased her Vitamin D and that has gotten better   She is prediabetic which she knows can contribute to the issue but isn't in anymore main than when she first started coming here last April

## 2019-05-22 NOTE — PROGRESS NOTES
[address] [facility],[address]  [address]   [place] [address] [place] [address] [address]   [phone] Office   [phone] Fax           Date:  19     Name:  Mariam Lundborg  :  1945  MRN:  [id number]     PCP:  Noble Ortega NP    Chief Complaint   Patient presents with    Follow-up     polyneuropathy      HISTORY OF PRESENT ILLNESS: Follow up for polyneuropathy, possibly CIDP by EMG/NCV. EMG results: Impression:  ABNORMAL     Extensive electrodiagnostic examination of the right upper and bilateral lower extremities shows the followin) Absent sensory responses in both lower extremities. Absent bilateral tibial motor responses. Prolonged fibular motor onset latencies and conduction slowing with amplitude drop bilaterally. Absent H-reflex and Prolonged F-responses. 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    2) Possible superimposed right median neuropathy at or distal to the wrist, severe in degree electrically     Recap from OV 10.04.2018: Glory Schwarz is a 72 y.o. female who presents for follow up of pain in the right low back/buttocks. She does have several areas of disc disease in the lumbar spine. However, EMG/NCS was more concerning for possible CIDP. Neuropathy labs revealed prediabetes and vitamin D deficiency. Patient feels that she has improved in terms of her pain by addressing these issues. She continues with a left foot drop which is her baseline. At this point she does not want to pursue further evaluation for possible IVIG treatment. We discussed meeting with Dr. Dima George in the future if she wants to discuss this is a possibility.     Visit Diagnoses      Polyneuropathy    -  Primary     Relevant Orders     · PROTEIN ELECTROPHORESIS     · VITAMIN B12 & FOLATE     · VITAMIN D, 1, 25 DIHYDROXY with significant deficiency-improved on supplements     · SED RATE (ESR)     · MICHAEL COMPREHENSIVE PLUS PANEL     · HEMOGLOBIN A1C WITH EAG with pre-DM  · Discussed IVIG and further workup but will hold off on this at this time     Acute low back pain without sciatica, unspecified back pain laterality      · Likely secondary to her disc disease  · Pain is improved at this point  · Will not proceed with physical therapy at this time     Carpal tunnel syndrome of right wrist      · Will monitor     Except as noted above, denies  fever, chills, cough. No CP or SOB. No dysuria, loss of bowel or bladder control. No Weight loss. Appetite good. Sleeping well. No sweats. No edema. No bruising or bleeding. No nausea or vomit. No diarrhea. No frequency, urgency, No depressive sxs. No anxiety. Denies sore throat, nasal congestion, nasal discharge, epistaxis, tinnitus, hearing loss, back pain, muscle pain, or joint pain. Current Outpatient Medications   Medication Sig    aspirin delayed-release (ADULT ASPIRIN REGIMEN) 81 mg tablet Take  by mouth.  diphenhydrAMINE (BENADRYL) 25 mg capsule Take  by mouth.  multivit-min-FA-lycopen-lutein (CENTRUM SILVER) 0.4-300-250 mg-mcg-mcg tab Take  by mouth.  RESTASIS 0.05 % ophthalmic emulsion INSTILL 1 DROP IN BOTH EYES BID    estradiol (ESTRACE) 0.01 % (0.1 mg/gram) vaginal cream Insert  into vagina. Indications: sm amount externally    lovastatin (MEVACOR) 20 mg tablet Indications: pt. takes 1/2 tablet nightly    meclizine (ANTIVERT) 25 mg tablet Take  by mouth.  calcium carbonate (TUMS ULTRA) 400 mg calcium (1,000 mg) chew Take  by mouth.  acetaminophen (TYLENOL EXTRA STRENGTH) 500 mg tablet Take  by mouth.  ascorbic acid, vitamin C, (VITAMIN C) 250 mg tablet Take  by mouth.  cholecalciferol, vitamin D3, 2,000 unit tab Take 5,000 Units by mouth.      No current facility-administered medications for this visit. No Known Allergies  History reviewed. No pertinent past medical history. History reviewed. No pertinent surgical history. Social History     Socioeconomic History    Marital status:      Spouse name: Not on file    Number of children: Not on file    Years of education: Not on file    Highest education level: Not on file   Occupational History    Not on file   Social Needs    Financial resource strain: Not on file    Food insecurity:     Worry: Not on file     Inability: Not on file    Transportation needs:     Medical: Not on file     Non-medical: Not on file   Tobacco Use    Smoking status: Never Smoker    Smokeless tobacco: Never Used   Substance and Sexual Activity    Alcohol use: Not on file    Drug use: Not on file    Sexual activity: Not on file   Lifestyle    Physical activity:     Days per week: Not on file     Minutes per session: Not on file    Stress: Not on file   Relationships    Social connections:     Talks on phone: Not on file     Gets together: Not on file     Attends Evangelical service: Not on file     Active member of club or organization: Not on file     Attends meetings of clubs or organizations: Not on file     Relationship status: Not on file    Intimate partner violence:     Fear of current or ex partner: Not on file     Emotionally abused: Not on file     Physically abused: Not on file     Forced sexual activity: Not on file   Other Topics Concern    Not on file   Social History Narrative    Not on file     History reviewed. No pertinent family history. PHYSICAL EXAMINATION:    Visit Vitals  /68   Pulse 61   Resp 20   Ht 5' 3\" (1.6 m)   SpO2 98%   BMI (P) 24.13 kg/m²     General:  Well defined, nourished, and groomed individual in no acute distress. Neck: Supple, nontender, no bruits, no pain with resistance to active range of motion. Heart: Regular rate and rhythm, no murmurs, rub, or gallop. Normal S1S2. Lungs:  Clear to auscultation bilaterally with equal chest expansion, no cough, no wheeze  Musculoskeletal:  Extremities revealed no edema and had full range of motion of joints. Psych:  Good mood and bright affect    NEUROLOGICAL EXAMINATION:     Mental Status:   Alert and oriented to person, place, and time with recent and remote memory intact. Attention span and concentration are normal. Speech is fluent with a full fund of knowledge. Cranial Nerves:    II, III, IV, VI:  Visual acuity grossly intact. Visual fields are normal.    Pupils are equal, round, and reactive to light and accommodation. Extra-ocular movements are full and fluid. Fundoscopic exam was benign, no ptosis or nystagmus. V-XII: Hearing is grossly intact. Facial features are symmetric, with normal sensation and strength. The palate rises symmetrically and the tongue protrudes midline. Sternocleidomastoids 5/5. Motor Examination: Normal tone, bulk, and strength, 5/5 muscle strength throughout. No cogwheel rigidity or clonus present. Sensory exam:  Not performed due to having compression stockings     Coordination:  Finger to nose and rapid arm movement testing was normal.   No resting or intention tremor    Gait and Station:  Steady while walking. Normal arm swing. No pronator drift. No muscle wasting or fasiculations noted. Reflexes:  DTRs 2+ throughout. ASSESSMENT AND PLAN    ICD-10-CM ICD-9-CM    1. CIDP (chronic inflammatory demyelinating polyneuropathy) ([facility]) G61.81 357.81 MICHAEL, DIRECT, W/REFLEX      PROTEIN ELECTROPHORESIS   2. Polyneuropathy G62.9 356.9      CIDP by EMG. Discussed this as a potential diagnosis and recommended follow up with Dr. Isaías Perez to discuss potential for treatment with IVIG. She indicated today that she felt that the neuropathy was actually better and it was not bothering her. As such, she elected to follow up as needed.  I did reorder a couple of tests that were not completed to include the MICHAEL and the serum protein electrophoresis to assess for any potential underlying cause. If these are abnormal, we can certainly give her a call. Otherwise, she will call the office in follow-up with Dr. Cort Severance if the neuropathy starts to be bothersome. Geovanna Colmenares

## 2019-05-27 LAB
ALBUMIN SERPL ELPH-MCNC: 4.3 G/DL (ref 2.9–4.4)
ALBUMIN/GLOB SERPL: 1.3 {RATIO} (ref 0.7–1.7)
ALPHA1 GLOB SERPL ELPH-MCNC: 0.3 G/DL (ref 0–0.4)
ALPHA2 GLOB SERPL ELPH-MCNC: 0.7 G/DL (ref 0.4–1)
ANA SER QL: NEGATIVE
B-GLOBULIN SERPL ELPH-MCNC: 0.8 G/DL (ref 0.7–1.3)
GAMMA GLOB SERPL ELPH-MCNC: 1.5 G/DL (ref 0.4–1.8)
GLOBULIN SER CALC-MCNC: 3.3 G/DL (ref 2.2–3.9)
M PROTEIN SERPL ELPH-MCNC: 0.6 G/DL
PLEASE NOTE, 011150: ABNORMAL
PROT SERPL-MCNC: 7.6 G/DL (ref 6–8.5)

## 2019-06-10 DIAGNOSIS — G62.9 POLYNEUROPATHY: Primary | ICD-10-CM

## 2019-06-10 DIAGNOSIS — G63 NEUROPATHY ASSOCIATED WITH MONOCLONAL GAMMOPATHY OF UNKNOWN SIGNIFICANCE (MGUS) (HCC): ICD-10-CM

## 2019-06-10 DIAGNOSIS — D47.2 NEUROPATHY ASSOCIATED WITH MONOCLONAL GAMMOPATHY OF UNKNOWN SIGNIFICANCE (MGUS) (HCC): ICD-10-CM

## 2019-06-17 ENCOUNTER — TELEPHONE (OUTPATIENT)
Dept: ONCOLOGY | Age: 74
End: 2019-06-17

## 2019-06-17 NOTE — TELEPHONE ENCOUNTER
Called patient at the request of Dr. Katy Calderon to schedule a new patient appointment with Dr. Lupis Head. Patient stated that she had requested for the referral to be sent to [address] [place] [address].

## 2019-11-23 ENCOUNTER — HOSPITAL ENCOUNTER (EMERGENCY)
Age: 74
Discharge: HOME | End: 2019-11-23
Attending: EMERGENCY MEDICINE
Payer: MEDICARE

## 2019-11-23 VITALS
SYSTOLIC BLOOD PRESSURE: 126 MMHG | TEMPERATURE: 97.9 F | DIASTOLIC BLOOD PRESSURE: 57 MMHG | WEIGHT: 119 LBS | OXYGEN SATURATION: 100 % | BODY MASS INDEX: 21.09 KG/M2 | HEIGHT: 63 IN | HEART RATE: 58 BPM | RESPIRATION RATE: 14 BRPM

## 2019-11-23 DIAGNOSIS — R00.2 PALPITATIONS: Primary | ICD-10-CM

## 2019-11-23 LAB
ANION GAP SERPL CALC-SCNC: 8 MMOL/L (ref 3–18)
BASOPHILS # BLD: 0 K/UL (ref 0–0.1)
BASOPHILS NFR BLD: 0 % (ref 0–2)
BUN SERPL-MCNC: 23 MG/DL (ref 7–18)
BUN/CREAT SERPL: 33 (ref 12–20)
CALCIUM SERPL-MCNC: 9.5 MG/DL (ref 8.5–10.1)
CHLORIDE SERPL-SCNC: 105 MMOL/L (ref 100–111)
CK MB CFR SERPL CALC: 4.1 % (ref 0–4)
CK MB SERPL-MCNC: 8.5 NG/ML (ref 5–25)
CK SERPL-CCNC: 206 U/L (ref 26–192)
CO2 SERPL-SCNC: 28 MMOL/L (ref 21–32)
CREAT SERPL-MCNC: 0.7 MG/DL (ref 0.6–1.3)
DIFFERENTIAL METHOD BLD: NORMAL
EOSINOPHIL # BLD: 0.1 K/UL (ref 0–0.4)
EOSINOPHIL NFR BLD: 2 % (ref 0–5)
ERYTHROCYTE [DISTWIDTH] IN BLOOD BY AUTOMATED COUNT: 13.8 % (ref 11.6–14.5)
GLUCOSE SERPL-MCNC: 100 MG/DL (ref 74–99)
HCT VFR BLD AUTO: 40.8 % (ref 35–45)
HGB BLD-MCNC: 13.4 G/DL (ref 12–16)
LYMPHOCYTES # BLD: 1.3 K/UL (ref 0.9–3.6)
LYMPHOCYTES NFR BLD: 23 % (ref 21–52)
MCH RBC QN AUTO: 29.2 PG (ref 24–34)
MCHC RBC AUTO-ENTMCNC: 32.8 G/DL (ref 31–37)
MCV RBC AUTO: 88.9 FL (ref 74–97)
MONOCYTES # BLD: 0.6 K/UL (ref 0.05–1.2)
MONOCYTES NFR BLD: 10 % (ref 3–10)
NEUTS SEG # BLD: 3.7 K/UL (ref 1.8–8)
NEUTS SEG NFR BLD: 65 % (ref 40–73)
PLATELET # BLD AUTO: 194 K/UL (ref 135–420)
PMV BLD AUTO: 9.6 FL (ref 9.2–11.8)
POTASSIUM SERPL-SCNC: 3.7 MMOL/L (ref 3.5–5.5)
RBC # BLD AUTO: 4.59 M/UL (ref 4.2–5.3)
SODIUM SERPL-SCNC: 141 MMOL/L (ref 136–145)
TROPONIN I SERPL-MCNC: <0.02 NG/ML (ref 0–0.04)
WBC # BLD AUTO: 5.7 K/UL (ref 4.6–13.2)

## 2019-11-23 PROCEDURE — 80048 BASIC METABOLIC PNL TOTAL CA: CPT

## 2019-11-23 PROCEDURE — 94762 N-INVAS EAR/PLS OXIMTRY CONT: CPT

## 2019-11-23 PROCEDURE — 85025 COMPLETE CBC W/AUTO DIFF WBC: CPT

## 2019-11-23 PROCEDURE — 93005 ELECTROCARDIOGRAM TRACING: CPT

## 2019-11-23 PROCEDURE — 84443 ASSAY THYROID STIM HORMONE: CPT

## 2019-11-23 PROCEDURE — 99285 EMERGENCY DEPT VISIT HI MDM: CPT

## 2019-11-23 PROCEDURE — 82550 ASSAY OF CK (CPK): CPT

## 2019-11-23 RX ORDER — METFORMIN HYDROCHLORIDE 500 MG/1
TABLET ORAL 2 TIMES DAILY WITH MEALS
COMMUNITY

## 2019-11-24 LAB
ATRIAL RATE: 70 BPM
CALCULATED P AXIS, ECG09: 82 DEGREES
CALCULATED R AXIS, ECG10: 63 DEGREES
CALCULATED T AXIS, ECG11: 14 DEGREES
DIAGNOSIS, 93000: NORMAL
P-R INTERVAL, ECG05: 152 MS
Q-T INTERVAL, ECG07: 390 MS
QRS DURATION, ECG06: 90 MS
QTC CALCULATION (BEZET), ECG08: 421 MS
TSH SERPL DL<=0.05 MIU/L-ACNC: 2.1 UIU/ML (ref 0.36–3.74)
VENTRICULAR RATE, ECG03: 70 BPM

## 2019-11-24 NOTE — ED TRIAGE NOTES
Patient ambulatory to ED with c/o high blood pressure and palpitations. Patient states she had elevated blood pressure in the 140's starting at 1815 and a rapid heart rate for approximately 30 minutes. Patient states she does have anxiety and does not feel the palpitations now. Patient is alert and oriented in triage and able to make needs known.

## 2019-11-24 NOTE — DISCHARGE INSTRUCTIONS

## 2019-11-24 NOTE — ED PROVIDER NOTES
EMERGENCY DEPARTMENT HISTORY AND PHYSICAL EXAM    Date: 11/23/2019  Patient Name: Mirta Abbott    History of Presenting Illness     Chief Complaint   Patient presents with    Hypertension    Rapid Heart Rate         History Provided By: Patient, Patient's  and Patient's Daughter    8:13 PM  Mirta Abbott is a 69 y.o. female with PMHX of DM, neuropathy and anxiety who presents to the emergency department C/O palpitations. Patient states 2 hours ago she was simply painting a glass window when she developed palpitations described as heart racing. She used her 's pulse ox and noted her pulse to be 137-140 with a pulse ox of 99%. She called the Medicare nursing hotline, was told that they did not think she was having a heart attack but should go to the hospital to be checked out.  states that as soon as the nurse told her this is her pulse immediately went to 80 and her symptoms stopped. Patient feels fine at this time and has no complaints. No associated symptoms. Patient states she occasionally has palpitations but they usually last only a few seconds at a time. She had similar symptoms while living in [place] 10 years ago for which she saw a cardiologist and reportedly had a normal stress test, normal echo and wore a Holter monitor for 1 week which did not show any abnormalities. Patient states she has been under slight increased stress the past 2 weeks she recently moved from [place]. Pt denies chest pain, shortness of breath, nausea, vomiting, syncope, near syncope, cough, leg swelling or pain, and any other sxs or complaints. PCP: Tiffanie Ames NP    Current Outpatient Medications   Medication Sig Dispense Refill    metFORMIN (GLUCOPHAGE) 500 mg tablet Take  by mouth two (2) times daily (with meals).  aspirin delayed-release (ADULT ASPIRIN REGIMEN) 81 mg tablet Take  by mouth.  diphenhydrAMINE (BENADRYL) 25 mg capsule Take  by mouth.       multivit-min-FA-lycopen-lutein (CENTRUM SILVER) 0.4-300-250 mg-mcg-mcg tab Take  by mouth.  RESTASIS 0.05 % ophthalmic emulsion INSTILL 1 DROP IN BOTH EYES BID  3    estradiol (ESTRACE) 0.01 % (0.1 mg/gram) vaginal cream Insert  into vagina. Indications: sm amount externally      lovastatin (MEVACOR) 20 mg tablet Indications: pt. takes 1/2 tablet nightly      meclizine (ANTIVERT) 25 mg tablet Take  by mouth.  calcium carbonate (TUMS ULTRA) 400 mg calcium (1,000 mg) chew Take  by mouth.  ascorbic acid, vitamin C, (VITAMIN C) 250 mg tablet Take  by mouth.  cholecalciferol, vitamin D3, 2,000 unit tab Take 5,000 Units by mouth.  acetaminophen (TYLENOL EXTRA STRENGTH) 500 mg tablet Take  by mouth. Past History     Past Medical History:  Past Medical History:   Diagnosis Date    Diabetes ([address]     Pre diabetic. Takes Metformin       Past Surgical History:  Past Surgical History:   Procedure Laterality Date    HX ORTHOPAEDIC      Back surgery 14       Family History:  History reviewed. No pertinent family history. Social History:  Social History     Tobacco Use    Smoking status: Never Smoker    Smokeless tobacco: Never Used   Substance Use Topics    Alcohol use: Not Currently    Drug use: Not Currently       Allergies:  No Known Allergies      Review of Systems   Review of Systems   Constitutional: Negative for fever. Respiratory: Negative for cough and shortness of breath. Cardiovascular: Positive for palpitations. Negative for chest pain. Gastrointestinal: Negative for abdominal pain, nausea and vomiting. Neurological: Negative for syncope and light-headedness. All other systems reviewed and are negative.         Physical Exam     Vitals:    11/23/19 2100 11/23/19 2115 11/23/19 2130 11/23/19 2145   BP: 130/60 122/58 122/57 129/67   Pulse: 61 (!) 59 61 60   Resp: 23 20 15 14   Temp:       SpO2: 99% 98% 99% 99%   Weight:       Height:         Physical Exam    Vital signs and nursing notes reviewed. CONSTITUTIONAL: Alert. Well-appearing; well-nourished; in no apparent distress. CV: Normal S1, S2; no murmurs, rubs, or gallops. No chest wall tenderness. RESPIRATORY: Normal chest excursion with respiration; breath sounds clear and equal bilaterally; no wheezes, rhonchi, or rales. GI: Normal bowel sounds; non-distended; non-tender; no guarding or rigidity; no palpable organomegaly. No CVA tenderness. BACK:  No evidence of trauma or deformity. Non-tender to palpation. FROM without difficulty. EXT: Normal ROM in all four extremities; non-tender to palpation. No edema or calf tenderness  SKIN: Normal for age and race; warm; dry; good turgor; no apparent lesions or exudate. NEURO: A & O x3. PSYCH:  Mood and affect appropriate. Diagnostic Study Results     Labs -     Recent Results (from the past 12 hour(s))   CBC WITH AUTOMATED DIFF    Collection Time: 11/23/19  8:40 PM   Result Value Ref Range    WBC 5.7 4.6 - 13.2 K/uL    RBC 4.59 4.20 - 5.30 M/uL    HGB 13.4 12.0 - 16.0 g/dL    HCT 40.8 35.0 - 45.0 %    MCV 88.9 74.0 - 97.0 FL    MCH 29.2 24.0 - 34.0 PG    MCHC 32.8 31.0 - 37.0 g/dL    RDW 13.8 11.6 - 14.5 %    PLATELET 508 997 - 946 K/uL    MPV 9.6 9.2 - 11.8 FL    NEUTROPHILS 65 40 - 73 %    LYMPHOCYTES 23 21 - 52 %    MONOCYTES 10 3 - 10 %    EOSINOPHILS 2 0 - 5 %    BASOPHILS 0 0 - 2 %    ABS. NEUTROPHILS 3.7 1.8 - 8.0 K/UL    ABS. LYMPHOCYTES 1.3 0.9 - 3.6 K/UL    ABS. MONOCYTES 0.6 0.05 - 1.2 K/UL    ABS. EOSINOPHILS 0.1 0.0 - 0.4 K/UL    ABS.  BASOPHILS 0.0 0.0 - 0.1 K/UL    DF AUTOMATED     METABOLIC PANEL, BASIC    Collection Time: 11/23/19  8:40 PM   Result Value Ref Range    Sodium 141 136 - 145 mmol/L    Potassium 3.7 3.5 - 5.5 mmol/L    Chloride 105 100 - 111 mmol/L    CO2 28 21 - 32 mmol/L    Anion gap 8 3.0 - 18 mmol/L    Glucose 100 (H) 74 - 99 mg/dL    BUN 23 (H) 7.0 - 18 MG/DL    Creatinine 0.70 0.6 - 1.3 MG/DL    BUN/Creatinine ratio 33 (H) 12 - 20      GFR est AA >60 >60 ml/min/1.73m2    GFR est non-AA >60 >60 ml/min/1.73m2    Calcium 9.5 8.5 - 10.1 MG/DL   CARDIAC PANEL,(CK, CKMB & TROPONIN)    Collection Time: 11/23/19  8:40 PM   Result Value Ref Range     (H) 26 - 192 U/L    CK - MB 8.5 (H) <3.6 ng/ml    CK-MB Index 4.1 (H) 0.0 - 4.0 %    Troponin-I, QT <0.02 0.0 - 0.045 NG/ML       Radiologic Studies - none  No orders to display     CT Results  (Last 48 hours)    None        CXR Results  (Last 48 hours)    None          Medications given in the ED-  Medications - No data to display      Medical Decision Making   I am the first provider for this patient. I reviewed the vital signs, available nursing notes, past medical history, past surgical history, family history and social history. Vital Signs-Reviewed the patient's vital signs. Pulse Oximetry Analysis - 100% on RA     Cardiac Monitor:  Rate: 73 bpm  Rhythm: NSR    EKG interpretation: (Preliminary)  Normal sinus rhythm, rate 70, no STEMI    Records Reviewed: Nursing Notes      Procedures:  Procedures    ED Course:  8:13 PM   Initial assessment performed. The patients presenting problems have been discussed, and they are in agreement with the care plan formulated and outlined with them. I have encouraged them to ask questions as they arise throughout their visit. Provider Notes (Medical Decision Making): Aida Wheeler is a 69 y.o. female presents with 30 minutes of palpitations, feeling like her heart was beating fast.  Resolved spontaneously and she was asymptomatic at time of ED arrival.  Her vitals stable, not tachycardic and EKG showed normal sinus rhythm. Labs and electrolytes unremarkable, TSH sent. She has remained asymptomatic in normal sinus rhythm on the monitor during ED stay. She did not have any other associated symptoms to suggest ACS troponins negative.   Patient feels that her symptoms were due to anxiety attack she has had it before and has been under some increased stress due to recent move. They recently moved to the area from [address] so will provide PCP referral options as well as cardiology. Advised to return to ED if any recurrence of her symptoms or concerns. Diagnosis and Disposition       DISCHARGE NOTE:    Laura Wilhelm  results have been reviewed with her. She has been counseled regarding her diagnosis, treatment, and plan. She verbally conveys understanding and agreement of the signs, symptoms, diagnosis, treatment and prognosis and additionally agrees to follow up as discussed. She also agrees with the care-plan and conveys that all of her questions have been answered. I have also provided discharge instructions for her that include: educational information regarding their diagnosis and treatment, and list of reasons why they would want to return to the ED prior to their follow-up appointment, should her condition change. She has been provided with education for proper emergency department utilization. CLINICAL IMPRESSION:    1. Palpitations        PLAN:  1. D/C Home  2. Current Discharge Medication List        3.    Follow-up Information     Follow up With Specialties Details Why Contact Info    Raenelle Favre, DO Internal Medicine Schedule an appointment as soon as possible for a visit  [address] [facility]      Dee Fenton MD Internal Medicine Schedule an appointment as soon as possible for a visit  [facility] [place] [facility]  [phone]      Kimberly Ovalles MD Cardiology Schedule an appointment as soon as possible for a visit  [address] [facility] [address]  [address] [place] [address]  [phone]      [facility] EMERGENCY DEPT Emergency Medicine  As needed, If symptoms worsen [address] [place] [id number]  [phone]        _______________________________      Please note that this dictation was completed with [employer], the [employer] voice recognition software. Quite often unanticipated grammatical, syntax, homophones, and other interpretive errors are inadvertently transcribed by the computer software. Please disregard these errors. Please excuse any errors that have escaped final proofreading.

## 2022-04-13 ENCOUNTER — TRANSCRIBE ORDER (OUTPATIENT)
Dept: REGISTRATION | Age: 77
End: 2022-04-13

## 2022-04-13 ENCOUNTER — HOSPITAL ENCOUNTER (OUTPATIENT)
Dept: LAB | Age: 77
Discharge: HOME | End: 2022-04-13
Payer: MEDICARE

## 2022-04-13 ENCOUNTER — HOSPITAL ENCOUNTER (OUTPATIENT)
Dept: NON INVASIVE DIAGNOSTICS | Age: 77
Discharge: HOME | End: 2022-04-13
Payer: MEDICARE

## 2022-04-13 DIAGNOSIS — Z01.818 OTHER SPECIFIED PRE-OPERATIVE EXAMINATION: ICD-10-CM

## 2022-04-13 DIAGNOSIS — Z01.818 OTHER SPECIFIED PRE-OPERATIVE EXAMINATION: Primary | ICD-10-CM

## 2022-04-13 LAB
HCT VFR BLD AUTO: 41.9 % (ref 35–45)
HGB BLD-MCNC: 13.4 G/DL (ref 12–16)
POTASSIUM SERPL-SCNC: 4.6 MMOL/L (ref 3.5–5.5)

## 2022-04-13 PROCEDURE — 85018 HEMOGLOBIN: CPT

## 2022-04-13 PROCEDURE — 93005 ELECTROCARDIOGRAM TRACING: CPT

## 2022-04-13 PROCEDURE — 36415 COLL VENOUS BLD VENIPUNCTURE: CPT

## 2022-04-13 PROCEDURE — 84132 ASSAY OF SERUM POTASSIUM: CPT

## 2022-04-14 LAB
ATRIAL RATE: 64 BPM
CALCULATED P AXIS, ECG09: 70 DEGREES
CALCULATED R AXIS, ECG10: 82 DEGREES
CALCULATED T AXIS, ECG11: -22 DEGREES
DIAGNOSIS, 93000: NORMAL
P-R INTERVAL, ECG05: 140 MS
Q-T INTERVAL, ECG07: 404 MS
QRS DURATION, ECG06: 90 MS
QTC CALCULATION (BEZET), ECG08: 416 MS
VENTRICULAR RATE, ECG03: 64 BPM

## 2022-10-11 ENCOUNTER — TRANSCRIBE ORDER (OUTPATIENT)
Dept: REGISTRATION | Age: 77
End: 2022-10-11

## 2022-10-11 ENCOUNTER — HOSPITAL ENCOUNTER (OUTPATIENT)
Dept: LAB | Age: 77
Discharge: HOME | End: 2022-10-11
Payer: MEDICARE

## 2022-10-11 ENCOUNTER — HOSPITAL ENCOUNTER (OUTPATIENT)
Dept: NON INVASIVE DIAGNOSTICS | Age: 77
Discharge: HOME | End: 2022-10-11
Payer: MEDICARE

## 2022-10-11 DIAGNOSIS — Z01.818 OTHER SPECIFIED PRE-OPERATIVE EXAMINATION: ICD-10-CM

## 2022-10-11 DIAGNOSIS — Z01.818 OTHER SPECIFIED PRE-OPERATIVE EXAMINATION: Primary | ICD-10-CM

## 2022-10-11 LAB
HCT VFR BLD AUTO: 38.5 % (ref 35–45)
HGB BLD-MCNC: 12.7 G/DL (ref 12–16)
POTASSIUM SERPL-SCNC: 4.1 MMOL/L (ref 3.5–5.5)

## 2022-10-11 PROCEDURE — 85018 HEMOGLOBIN: CPT

## 2022-10-11 PROCEDURE — 93005 ELECTROCARDIOGRAM TRACING: CPT

## 2022-10-11 PROCEDURE — 84132 ASSAY OF SERUM POTASSIUM: CPT

## 2022-10-11 PROCEDURE — 36415 COLL VENOUS BLD VENIPUNCTURE: CPT

## 2022-10-12 LAB
ATRIAL RATE: 65 BPM
CALCULATED P AXIS, ECG09: 77 DEGREES
CALCULATED R AXIS, ECG10: 81 DEGREES
CALCULATED T AXIS, ECG11: 12 DEGREES
DIAGNOSIS, 93000: NORMAL
P-R INTERVAL, ECG05: 148 MS
Q-T INTERVAL, ECG07: 412 MS
QRS DURATION, ECG06: 92 MS
QTC CALCULATION (BEZET), ECG08: 428 MS
VENTRICULAR RATE, ECG03: 65 BPM

## 2024-07-24 NOTE — PROGRESS NOTES
Chief Complaint   Patient presents with    Numbness     Feet     1. Have you been to the ER, urgent care clinic since your last visit? Hospitalized since your last visit? No    2. Have you seen or consulted any other health care providers outside of the [address] [place] [address] since your last visit? Include any pap smears or colon screening.  No     Reviewed record in preparation for visit and have necessary documentation  Pt did not bring medication to office visit for review  Medication list reviewed and reconciled with patient  Information was given to pt on Advanced Directives, Living Will  opportunity was given for questions The left coronary artery was selectively engaged and injected.

## 2024-08-24 ENCOUNTER — HOSPITAL ENCOUNTER (EMERGENCY)
Facility: HOSPITAL | Age: 79
Discharge: HOME | End: 2024-08-25
Attending: EMERGENCY MEDICINE
Payer: MEDICARE

## 2024-08-24 DIAGNOSIS — I48.92 ATRIAL FLUTTER WITH RAPID VENTRICULAR RESPONSE (HCC): ICD-10-CM

## 2024-08-24 DIAGNOSIS — I48.91 ATRIAL FIBRILLATION, RAPID (HCC): Primary | ICD-10-CM

## 2024-08-24 PROCEDURE — 94762 N-INVAS EAR/PLS OXIMTRY CONT: CPT

## 2024-08-24 PROCEDURE — 96374 THER/PROPH/DIAG INJ IV PUSH: CPT

## 2024-08-24 PROCEDURE — 99285 EMERGENCY DEPT VISIT HI MDM: CPT

## 2024-08-24 PROCEDURE — 96361 HYDRATE IV INFUSION ADD-ON: CPT

## 2024-08-24 PROCEDURE — 93005 ELECTROCARDIOGRAM TRACING: CPT | Performed by: EMERGENCY MEDICINE

## 2024-08-24 PROCEDURE — 96376 TX/PRO/DX INJ SAME DRUG ADON: CPT

## 2024-08-24 ASSESSMENT — PAIN SCALES - GENERAL: PAINLEVEL_OUTOF10: 0

## 2024-08-24 ASSESSMENT — PAIN - FUNCTIONAL ASSESSMENT: PAIN_FUNCTIONAL_ASSESSMENT: 0-10

## 2024-08-25 ENCOUNTER — APPOINTMENT (OUTPATIENT)
Facility: HOSPITAL | Age: 79
End: 2024-08-25
Payer: MEDICARE

## 2024-08-25 VITALS
WEIGHT: 133 LBS | OXYGEN SATURATION: 100 % | HEIGHT: 63 IN | HEART RATE: 62 BPM | TEMPERATURE: 98.4 F | DIASTOLIC BLOOD PRESSURE: 67 MMHG | BODY MASS INDEX: 23.57 KG/M2 | SYSTOLIC BLOOD PRESSURE: 120 MMHG | RESPIRATION RATE: 12 BRPM

## 2024-08-25 LAB
ALBUMIN SERPL-MCNC: 4.1 G/DL (ref 3.4–5)
ALBUMIN/GLOB SERPL: 1.3 (ref 0.8–1.7)
ALP SERPL-CCNC: 47 U/L (ref 45–117)
ALT SERPL-CCNC: 31 U/L (ref 13–56)
ANION GAP SERPL CALC-SCNC: 9 MMOL/L (ref 3–18)
AST SERPL-CCNC: 32 U/L (ref 10–38)
BASOPHILS # BLD: 0 K/UL (ref 0–0.1)
BASOPHILS NFR BLD: 1 % (ref 0–2)
BILIRUB SERPL-MCNC: 0.5 MG/DL (ref 0.2–1)
BUN SERPL-MCNC: 25 MG/DL (ref 7–18)
BUN/CREAT SERPL: 38 (ref 12–20)
CALCIUM SERPL-MCNC: 9.8 MG/DL (ref 8.5–10.1)
CHLORIDE SERPL-SCNC: 99 MMOL/L (ref 100–111)
CO2 SERPL-SCNC: 29 MMOL/L (ref 21–32)
CREAT SERPL-MCNC: 0.66 MG/DL (ref 0.6–1.3)
DIFFERENTIAL METHOD BLD: ABNORMAL
EKG ATRIAL RATE: 244 BPM
EKG ATRIAL RATE: 63 BPM
EKG DIAGNOSIS: NORMAL
EKG P AXIS: 257 DEGREES
EKG P AXIS: 43 DEGREES
EKG P-R INTERVAL: 172 MS
EKG Q-T INTERVAL: 284 MS
EKG Q-T INTERVAL: 288 MS
EKG Q-T INTERVAL: 424 MS
EKG QRS DURATION: 76 MS
EKG QRS DURATION: 84 MS
EKG QRS DURATION: 90 MS
EKG QTC CALCULATION (BAZETT): 403 MS
EKG QTC CALCULATION (BAZETT): 410 MS
EKG QTC CALCULATION (BAZETT): 433 MS
EKG R AXIS: 46 DEGREES
EKG R AXIS: 48 DEGREES
EKG R AXIS: 50 DEGREES
EKG T AXIS: -16 DEGREES
EKG T AXIS: -67 DEGREES
EKG T AXIS: 270 DEGREES
EKG VENTRICULAR RATE: 121 BPM
EKG VENTRICULAR RATE: 122 BPM
EKG VENTRICULAR RATE: 63 BPM
EOSINOPHIL # BLD: 0.1 K/UL (ref 0–0.4)
EOSINOPHIL NFR BLD: 1 % (ref 0–5)
ERYTHROCYTE [DISTWIDTH] IN BLOOD BY AUTOMATED COUNT: 12.4 % (ref 11.6–14.5)
GLOBULIN SER CALC-MCNC: 3.1 G/DL (ref 2–4)
GLUCOSE SERPL-MCNC: 99 MG/DL (ref 74–99)
HCT VFR BLD AUTO: 40.9 % (ref 35–45)
HGB BLD-MCNC: 13.8 G/DL (ref 12–16)
IMM GRANULOCYTES # BLD AUTO: 0.1 K/UL (ref 0–0.04)
IMM GRANULOCYTES NFR BLD AUTO: 1 % (ref 0–0.5)
LYMPHOCYTES # BLD: 0.9 K/UL (ref 0.9–3.6)
LYMPHOCYTES NFR BLD: 16 % (ref 21–52)
MAGNESIUM SERPL-MCNC: 2 MG/DL (ref 1.6–2.6)
MCH RBC QN AUTO: 30.5 PG (ref 24–34)
MCHC RBC AUTO-ENTMCNC: 33.7 G/DL (ref 31–37)
MCV RBC AUTO: 90.3 FL (ref 78–100)
MONOCYTES # BLD: 0.5 K/UL (ref 0.05–1.2)
MONOCYTES NFR BLD: 9 % (ref 3–10)
NEUTS SEG # BLD: 4.3 K/UL (ref 1.8–8)
NEUTS SEG NFR BLD: 73 % (ref 40–73)
NRBC # BLD: 0 K/UL (ref 0–0.01)
NRBC BLD-RTO: 0 PER 100 WBC
PLATELET # BLD AUTO: 184 K/UL (ref 135–420)
PMV BLD AUTO: 9.6 FL (ref 9.2–11.8)
POTASSIUM SERPL-SCNC: 3.8 MMOL/L (ref 3.5–5.5)
PROT SERPL-MCNC: 7.2 G/DL (ref 6.4–8.2)
RBC # BLD AUTO: 4.53 M/UL (ref 4.2–5.3)
SODIUM SERPL-SCNC: 137 MMOL/L (ref 136–145)
TROPONIN I SERPL HS-MCNC: 24 NG/L (ref 0–54)
TROPONIN I SERPL HS-MCNC: 65 NG/L (ref 0–54)
WBC # BLD AUTO: 5.9 K/UL (ref 4.6–13.2)

## 2024-08-25 PROCEDURE — 2500000003 HC RX 250 WO HCPCS: Performed by: EMERGENCY MEDICINE

## 2024-08-25 PROCEDURE — 96374 THER/PROPH/DIAG INJ IV PUSH: CPT

## 2024-08-25 PROCEDURE — 80053 COMPREHEN METABOLIC PANEL: CPT

## 2024-08-25 PROCEDURE — 93010 ELECTROCARDIOGRAM REPORT: CPT | Performed by: INTERNAL MEDICINE

## 2024-08-25 PROCEDURE — 93005 ELECTROCARDIOGRAM TRACING: CPT | Performed by: EMERGENCY MEDICINE

## 2024-08-25 PROCEDURE — 83735 ASSAY OF MAGNESIUM: CPT

## 2024-08-25 PROCEDURE — 2580000003 HC RX 258: Performed by: EMERGENCY MEDICINE

## 2024-08-25 PROCEDURE — 6370000000 HC RX 637 (ALT 250 FOR IP): Performed by: EMERGENCY MEDICINE

## 2024-08-25 PROCEDURE — 96361 HYDRATE IV INFUSION ADD-ON: CPT

## 2024-08-25 PROCEDURE — 85025 COMPLETE CBC W/AUTO DIFF WBC: CPT

## 2024-08-25 PROCEDURE — 71045 X-RAY EXAM CHEST 1 VIEW: CPT

## 2024-08-25 PROCEDURE — 96376 TX/PRO/DX INJ SAME DRUG ADON: CPT

## 2024-08-25 PROCEDURE — 84484 ASSAY OF TROPONIN QUANT: CPT

## 2024-08-25 RX ORDER — 0.9 % SODIUM CHLORIDE 0.9 %
1000 INTRAVENOUS SOLUTION INTRAVENOUS ONCE
Status: COMPLETED | OUTPATIENT
Start: 2024-08-25 | End: 2024-08-25

## 2024-08-25 RX ORDER — DILTIAZEM HYDROCHLORIDE 5 MG/ML
10 INJECTION INTRAVENOUS
Status: COMPLETED | OUTPATIENT
Start: 2024-08-25 | End: 2024-08-25

## 2024-08-25 RX ORDER — METOPROLOL TARTRATE 25 MG/1
12.5 TABLET, FILM COATED ORAL 2 TIMES DAILY
Qty: 30 TABLET | Refills: 0 | Status: SHIPPED | OUTPATIENT
Start: 2024-08-25 | End: 2024-09-24

## 2024-08-25 RX ORDER — METOPROLOL TARTRATE 25 MG/1
12.5 TABLET, FILM COATED ORAL
Status: COMPLETED | OUTPATIENT
Start: 2024-08-25 | End: 2024-08-25

## 2024-08-25 RX ADMIN — DILTIAZEM HYDROCHLORIDE 10 MG: 5 INJECTION, SOLUTION INTRAVENOUS at 01:37

## 2024-08-25 RX ADMIN — METOPROLOL TARTRATE 12.5 MG: 25 TABLET, FILM COATED ORAL at 03:22

## 2024-08-25 RX ADMIN — SODIUM CHLORIDE 1000 ML: 9 INJECTION, SOLUTION INTRAVENOUS at 00:28

## 2024-08-25 RX ADMIN — DILTIAZEM HYDROCHLORIDE 10 MG: 5 INJECTION, SOLUTION INTRAVENOUS at 00:26

## 2024-08-25 RX ADMIN — APIXABAN 5 MG: 5 TABLET, FILM COATED ORAL at 03:22

## 2024-08-25 NOTE — DISCHARGE INSTRUCTIONS
Thank you for choosing [facility]'s Emergency Department for your care. It is our privilege to provide excellent care for you in your time of need. In the next several days, you may receive a survey via mail or email about your experience with our team. We would appreciate you taking a few minutes to complete the survey, as we use this information to learn what we have done well and what we could be doing better. Thank you for trusting us with your care.    -----------------------------------------------------------------------------  Below you will find a list of your tests from today's visit.   Labs  Recent Results (from the past 12 hour(s))   EKG 12 Lead    Collection Time: 08/24/24 11:19 PM   Result Value Ref Range    Ventricular Rate 122 BPM    Atrial Rate 244 BPM    QRS Duration 76 ms    Q-T Interval 288 ms    QTc Calculation (Bazett) 410 ms    P Axis 257 degrees    R Axis 46 degrees    T Axis 270 degrees    Diagnosis       Atrial flutter with 2:1 AV conduction  ST depression, consider subendocardial injury  Nonspecific T wave abnormality  Abnormal ECG  When compared with ECG of 11-OCT-2022 14:23,  Significant changes have occurred     CBC with Auto Differential    Collection Time: 08/25/24 12:10 AM   Result Value Ref Range    WBC 5.9 4.6 - 13.2 K/uL    RBC 4.53 4.20 - 5.30 M/uL    Hemoglobin 13.8 12.0 - 16.0 g/dL    Hematocrit 40.9 35.0 - 45.0 %    MCV 90.3 78.0 - 100.0 FL    MCH 30.5 24.0 - 34.0 PG    MCHC 33.7 31.0 - 37.0 g/dL    RDW 12.4 11.6 - 14.5 %    Platelets 184 135 - 420 K/uL    MPV 9.6 9.2 - 11.8 FL    Nucleated RBCs 0.0 0  WBC    nRBC 0.00 0.00 - 0.01 K/uL    Neutrophils % 73 40 - 73 %    Lymphocytes % 16 (L) 21 - 52 %    Monocytes % 9 3 - 10 %    Eosinophils % 1 0 - 5 %    Basophils % 1 0 - 2 %    Immature Granulocytes % 1 (H) 0.0 - 0.5 %    Neutrophils Absolute 4.3 1.8 - 8.0 K/UL    Lymphocytes Absolute 0.9 0.9 - 3.6 K/UL    Monocytes Absolute 0.5 0.05 - 1.2 K/UL

## 2024-08-25 NOTE — ED PROVIDER NOTES
[facility] EMERGENCY DEPT  EMERGENCY DEPARTMENT ENCOUNTER    Patient Name: Kae Diamond  MRN: [id number]  YOB: 1945  Provider: Simone Figueroa MD  PCP: Ravi Iyer DO   Time/Date of evaluation: 11:14 PM EDT on 8/24/24    History of Presenting Illness     Chief Complaint   Patient presents with    Palpitations    Numbness       History Provided by: Patient   History is limited by: Nothing    HISTORY (Narative):   Kae Diamond is a 79 y.o. female with a PMHX of diabetes  who presents to the emergency department (room 13) by POV C/O heart palpitations onset 9:30 PM tonight. Associated sxs include left arm numbness. Patient denies any other sxs or complaints.     Nursing Notes were all reviewed and agreed with or any disagreements were addressed in the HPI.    Past History     PAST MEDICAL HISTORY:  Past Medical History:   Diagnosis Date    Diabetes (HCC)     Pre diabetic. Takes Metformin       PAST SURGICAL HISTORY:  Past Surgical History:   Procedure Laterality Date    ORTHOPEDIC SURGERY      Back surgery 1991       FAMILY HISTORY:  No family history on file.    SOCIAL HISTORY:  Social History     Tobacco Use    Smoking status: Never    Smokeless tobacco: Never   Substance Use Topics    Alcohol use: Not Currently    Drug use: Not Currently       MEDICATIONS:  Current Facility-Administered Medications   Medication Dose Route Frequency Provider Last Rate Last Admin    metoprolol tartrate (LOPRESSOR) tablet 12.5 mg  12.5 mg Oral NOW Simone Figueroa MD        apixaban (ELIQUIS) tablet 5 mg  5 mg Oral NOW Simone Figueroa MD         Current Outpatient Medications   Medication Sig Dispense Refill    apixaban (ELIQUIS) 5 MG TABS tablet Take 1 tablet by mouth 2 times daily 60 tablet 0    metoprolol tartrate (LOPRESSOR) 25 MG tablet Take 0.5 tablets by mouth 2 times daily 30 tablet 0    acetaminophen (TYLENOL) 500 MG tablet Take by mouth      ascorbic acid (VITAMIN C) 250 MG tablet Take by mouth   Normocephalic and atraumatic.      Nose: Nose normal. No rhinorrhea.      Mouth/Throat:      Mouth: Mucous membranes are moist.      Pharynx: No oropharyngeal exudate or posterior oropharyngeal erythema.   Eyes:      General:         Right eye: No discharge.         Left eye: No discharge.      Extraocular Movements: Extraocular movements intact.      Conjunctiva/sclera: Conjunctivae normal.      Pupils: Pupils are equal, round, and reactive to light.   Cardiovascular:      Rate and Rhythm: Regular rhythm. Tachycardia present.      Heart sounds: No murmur heard.     No friction rub. No gallop.   Pulmonary:      Effort: Pulmonary effort is normal. No respiratory distress.      Breath sounds: No wheezing, rhonchi or rales.   Abdominal:      General: Bowel sounds are normal.      Palpations: Abdomen is soft.      Tenderness: There is no abdominal tenderness. There is no guarding or rebound.   Musculoskeletal:         General: No swelling, tenderness or deformity. Normal range of motion.      Cervical back: Normal range of motion and neck supple. No rigidity.   Lymphadenopathy:      Cervical: No cervical adenopathy.   Skin:     General: Skin is warm and dry.      Findings: No rash.   Neurological:      General: No focal deficit present.      Mental Status: She is alert and oriented to person, place, and time.   Psychiatric:         Mood and Affect: Mood normal.         Behavior: Behavior normal.         Diagnostic Study Results     LABS:  Recent Results (from the past 12 hour(s))   EKG 12 Lead    Collection Time: 08/24/24 11:19 PM   Result Value Ref Range    Ventricular Rate 122 BPM    Atrial Rate 244 BPM    QRS Duration 76 ms    Q-T Interval 288 ms    QTc Calculation (Bazett) 410 ms    P Axis 257 degrees    R Axis 46 degrees    T Axis 270 degrees    Diagnosis       Atrial flutter with 2:1 AV conduction  ST depression, consider subendocardial injury  Nonspecific T wave abnormality  Abnormal ECG  When compared with ECG

## 2024-08-25 NOTE — ED NOTES
Pt c/o cp and brief moments of intermittent B/L hand \"tingling\" that started around 2100 tonight. Pt states she has a hx of Neuropathy and also anxiety so \"it is hard to tell\" if her hand tingling is from her preexisting conditions. Pt denies tingling or numbness in legs. Pt is A&Ox4 answering questions in clear, concise, complete sentences with unlabored respirations. Pt denies hand tingling at this time and has elevated HR on cardiac monitor.

## 2024-10-28 ENCOUNTER — HOSPITAL ENCOUNTER (OUTPATIENT)
Facility: HOSPITAL | Age: 79
Discharge: HOME | End: 2024-10-30
Attending: INTERNAL MEDICINE
Payer: MEDICARE

## 2024-10-28 VITALS
WEIGHT: 133 LBS | DIASTOLIC BLOOD PRESSURE: 76 MMHG | SYSTOLIC BLOOD PRESSURE: 148 MMHG | BODY MASS INDEX: 23.57 KG/M2 | HEART RATE: 67 BPM | HEIGHT: 63 IN

## 2024-10-28 DIAGNOSIS — I48.0 PAROXYSMAL ATRIAL FIBRILLATION (HCC): ICD-10-CM

## 2024-10-28 LAB
ECHO AO ASC DIAM: 2.4 CM
ECHO AO ASCENDING AORTA INDEX: 1.47 CM/M2
ECHO AO ROOT DIAM: 3.2 CM
ECHO AO ROOT INDEX: 1.96 CM/M2
ECHO AV AREA PEAK VELOCITY: 2.6 CM2
ECHO AV AREA VTI: 2.7 CM2
ECHO AV AREA/BSA PEAK VELOCITY: 1.6 CM2/M2
ECHO AV AREA/BSA VTI: 1.7 CM2/M2
ECHO AV MEAN GRADIENT: 2 MMHG
ECHO AV MEAN VELOCITY: 0.8 M/S
ECHO AV PEAK GRADIENT: 5 MMHG
ECHO AV PEAK VELOCITY: 1.1 M/S
ECHO AV VELOCITY RATIO: 0.64
ECHO AV VTI: 29.3 CM
ECHO BSA: 1.64 M2
ECHO EST RA PRESSURE: 8 MMHG
ECHO LA DIAMETER INDEX: 1.78 CM/M2
ECHO LA DIAMETER: 2.9 CM
ECHO LA TO AORTIC ROOT RATIO: 0.91
ECHO LA VOL A-L A2C: 57 ML (ref 22–52)
ECHO LA VOL A-L A4C: 75 ML (ref 22–52)
ECHO LA VOL BP: 59 ML (ref 22–52)
ECHO LA VOL MOD A2C: 51 ML (ref 22–52)
ECHO LA VOL MOD A4C: 65 ML (ref 22–52)
ECHO LA VOL/BSA BIPLANE: 36 ML/M2 (ref 16–34)
ECHO LA VOLUME AREA LENGTH: 66 ML
ECHO LA VOLUME INDEX A-L A2C: 35 ML/M2 (ref 16–34)
ECHO LA VOLUME INDEX A-L A4C: 46 ML/M2 (ref 16–34)
ECHO LA VOLUME INDEX AREA LENGTH: 40 ML/M2 (ref 16–34)
ECHO LA VOLUME INDEX MOD A2C: 31 ML/M2 (ref 16–34)
ECHO LA VOLUME INDEX MOD A4C: 40 ML/M2 (ref 16–34)
ECHO LV E' LATERAL VELOCITY: 11.3 CM/S
ECHO LV E' SEPTAL VELOCITY: 9.3 CM/S
ECHO LV EDV A2C: 63 ML
ECHO LV EDV A4C: 60 ML
ECHO LV EDV BP: 61 ML (ref 56–104)
ECHO LV EDV INDEX A4C: 37 ML/M2
ECHO LV EDV INDEX BP: 37 ML/M2
ECHO LV EDV NDEX A2C: 39 ML/M2
ECHO LV EJECTION FRACTION A2C: 76 %
ECHO LV EJECTION FRACTION A4C: 66 %
ECHO LV EJECTION FRACTION BIPLANE: 69 % (ref 55–100)
ECHO LV ESV A2C: 15 ML
ECHO LV ESV A4C: 21 ML
ECHO LV ESV BP: 19 ML (ref 19–49)
ECHO LV ESV INDEX A2C: 9 ML/M2
ECHO LV ESV INDEX A4C: 13 ML/M2
ECHO LV ESV INDEX BP: 12 ML/M2
ECHO LV FRACTIONAL SHORTENING: 34 % (ref 28–44)
ECHO LV INTERNAL DIMENSION DIASTOLE INDEX: 2.52 CM/M2
ECHO LV INTERNAL DIMENSION DIASTOLIC: 4.1 CM (ref 3.9–5.3)
ECHO LV INTERNAL DIMENSION SYSTOLIC INDEX: 1.66 CM/M2
ECHO LV INTERNAL DIMENSION SYSTOLIC: 2.7 CM
ECHO LV IVSD: 0.9 CM (ref 0.6–0.9)
ECHO LV MASS 2D: 97.3 G (ref 67–162)
ECHO LV MASS INDEX 2D: 59.7 G/M2 (ref 43–95)
ECHO LV POSTERIOR WALL DIASTOLIC: 0.7 CM (ref 0.6–0.9)
ECHO LV RELATIVE WALL THICKNESS RATIO: 0.34
ECHO LVOT AREA: 3.8 CM2
ECHO LVOT AV VTI INDEX: 0.69
ECHO LVOT DIAM: 2.2 CM
ECHO LVOT MEAN GRADIENT: 1 MMHG
ECHO LVOT PEAK GRADIENT: 2 MMHG
ECHO LVOT PEAK VELOCITY: 0.7 M/S
ECHO LVOT STROKE VOLUME INDEX: 47.1 ML/M2
ECHO LVOT SV: 76.7 ML
ECHO LVOT VTI: 20.2 CM
ECHO MV A VELOCITY: 0.54 M/S
ECHO MV AREA PHT: 3.4 CM2
ECHO MV AREA VTI: 2.9 CM2
ECHO MV E DECELERATION TIME (DT): 185 MS
ECHO MV E VELOCITY: 0.8 M/S
ECHO MV E/A RATIO: 1.48
ECHO MV E/E' LATERAL: 7.08
ECHO MV E/E' RATIO (AVERAGED): 7.84
ECHO MV E/E' SEPTAL: 8.6
ECHO MV LVOT VTI INDEX: 1.3
ECHO MV MAX VELOCITY: 0.9 M/S
ECHO MV MEAN GRADIENT: 1 MMHG
ECHO MV MEAN VELOCITY: 0.4 M/S
ECHO MV PEAK GRADIENT: 3 MMHG
ECHO MV PRESSURE HALF TIME (PHT): 64.9 MS
ECHO MV VTI: 26.2 CM
ECHO PV MAX VELOCITY: 0.8 M/S
ECHO PV MEAN GRADIENT: 1 MMHG
ECHO PV MEAN VELOCITY: 0.5 M/S
ECHO PV PEAK GRADIENT: 3 MMHG
ECHO RA END SYSTOLIC VOLUME APICAL 4 CHAMBER INDEX BSA: 36 ML/M2
ECHO RA VOLUME: 58 ML
ECHO RIGHT VENTRICULAR SYSTOLIC PRESSURE (RVSP): 34 MMHG
ECHO RV FREE WALL PEAK S': 12.7 CM/S
ECHO RV INTERNAL DIMENSION: 4.8 CM
ECHO RV TAPSE: 3.9 CM (ref 1.7–?)
ECHO RVOT MEAN GRADIENT: 1 MMHG
ECHO RVOT PEAK GRADIENT: 3 MMHG
ECHO RVOT PEAK VELOCITY: 0.8 M/S
ECHO RVOT VTI: 19.4 CM
ECHO TV REGURGITANT MAX VELOCITY: 2.56 M/S
ECHO TV REGURGITANT PEAK GRADIENT: 26 MMHG

## 2024-10-28 PROCEDURE — 93306 TTE W/DOPPLER COMPLETE: CPT

## 2024-11-16 ENCOUNTER — APPOINTMENT (OUTPATIENT)
Facility: HOSPITAL | Age: 79
End: 2024-11-16
Payer: MEDICARE

## 2024-11-16 ENCOUNTER — HOSPITAL ENCOUNTER (EMERGENCY)
Facility: HOSPITAL | Age: 79
Discharge: HOME | End: 2024-11-16
Attending: EMERGENCY MEDICINE
Payer: MEDICARE

## 2024-11-16 VITALS
DIASTOLIC BLOOD PRESSURE: 71 MMHG | SYSTOLIC BLOOD PRESSURE: 142 MMHG | OXYGEN SATURATION: 99 % | HEART RATE: 61 BPM | TEMPERATURE: 98.2 F | HEIGHT: 63 IN | BODY MASS INDEX: 23.92 KG/M2 | RESPIRATION RATE: 12 BRPM | WEIGHT: 135 LBS

## 2024-11-16 DIAGNOSIS — I48.92 PAROXYSMAL ATRIAL FLUTTER (HCC): Primary | ICD-10-CM

## 2024-11-16 DIAGNOSIS — R00.2 PALPITATIONS: ICD-10-CM

## 2024-11-16 LAB
ALBUMIN SERPL-MCNC: 4.1 G/DL (ref 3.4–5)
ALBUMIN/GLOB SERPL: 1.2 (ref 0.8–1.7)
ALP SERPL-CCNC: 64 U/L (ref 45–117)
ALT SERPL-CCNC: 31 U/L (ref 13–56)
ANION GAP SERPL CALC-SCNC: 4 MMOL/L (ref 3–18)
AST SERPL-CCNC: 26 U/L (ref 10–38)
BASOPHILS # BLD: 0 K/UL (ref 0–0.1)
BASOPHILS NFR BLD: 1 % (ref 0–2)
BILIRUB SERPL-MCNC: 0.4 MG/DL (ref 0.2–1)
BUN SERPL-MCNC: 23 MG/DL (ref 7–18)
BUN/CREAT SERPL: 29 (ref 12–20)
CALCIUM SERPL-MCNC: 9.9 MG/DL (ref 8.5–10.1)
CHLORIDE SERPL-SCNC: 103 MMOL/L (ref 100–111)
CO2 SERPL-SCNC: 30 MMOL/L (ref 21–32)
CREAT SERPL-MCNC: 0.8 MG/DL (ref 0.6–1.3)
DIFFERENTIAL METHOD BLD: ABNORMAL
EKG ATRIAL RATE: 127 BPM
EKG DIAGNOSIS: NORMAL
EKG P AXIS: 134 DEGREES
EKG P-R INTERVAL: 174 MS
EKG Q-T INTERVAL: 272 MS
EKG QRS DURATION: 140 MS
EKG QTC CALCULATION (BAZETT): 395 MS
EKG R AXIS: 260 DEGREES
EKG T AXIS: -25 DEGREES
EKG VENTRICULAR RATE: 127 BPM
EOSINOPHIL # BLD: 0.1 K/UL (ref 0–0.4)
EOSINOPHIL NFR BLD: 1 % (ref 0–5)
ERYTHROCYTE [DISTWIDTH] IN BLOOD BY AUTOMATED COUNT: 12.6 % (ref 11.6–14.5)
GLOBULIN SER CALC-MCNC: 3.5 G/DL (ref 2–4)
GLUCOSE SERPL-MCNC: 106 MG/DL (ref 74–99)
HCT VFR BLD AUTO: 42.9 % (ref 35–45)
HGB BLD-MCNC: 14.2 G/DL (ref 12–16)
IMM GRANULOCYTES # BLD AUTO: 0 K/UL (ref 0–0.04)
IMM GRANULOCYTES NFR BLD AUTO: 1 % (ref 0–0.5)
LYMPHOCYTES # BLD: 1.3 K/UL (ref 0.9–3.6)
LYMPHOCYTES NFR BLD: 22 % (ref 21–52)
MAGNESIUM SERPL-MCNC: 2.2 MG/DL (ref 1.6–2.6)
MCH RBC QN AUTO: 30.2 PG (ref 24–34)
MCHC RBC AUTO-ENTMCNC: 33.1 G/DL (ref 31–37)
MCV RBC AUTO: 91.3 FL (ref 78–100)
MONOCYTES # BLD: 0.5 K/UL (ref 0.05–1.2)
MONOCYTES NFR BLD: 9 % (ref 3–10)
NEUTS SEG # BLD: 4 K/UL (ref 1.8–8)
NEUTS SEG NFR BLD: 67 % (ref 40–73)
NRBC # BLD: 0 K/UL (ref 0–0.01)
NRBC BLD-RTO: 0 PER 100 WBC
PLATELET # BLD AUTO: 207 K/UL (ref 135–420)
PMV BLD AUTO: 9.6 FL (ref 9.2–11.8)
POTASSIUM SERPL-SCNC: 4 MMOL/L (ref 3.5–5.5)
PROT SERPL-MCNC: 7.6 G/DL (ref 6.4–8.2)
RBC # BLD AUTO: 4.7 M/UL (ref 4.2–5.3)
SODIUM SERPL-SCNC: 137 MMOL/L (ref 136–145)
TROPONIN I SERPL HS-MCNC: 8 NG/L (ref 0–54)
TROPONIN I SERPL HS-MCNC: 8 NG/L (ref 0–54)
TSH SERPL DL<=0.05 MIU/L-ACNC: 2.12 UIU/ML (ref 0.36–3.74)
WBC # BLD AUTO: 5.9 K/UL (ref 4.6–13.2)

## 2024-11-16 PROCEDURE — 71045 X-RAY EXAM CHEST 1 VIEW: CPT

## 2024-11-16 PROCEDURE — 83735 ASSAY OF MAGNESIUM: CPT

## 2024-11-16 PROCEDURE — 80053 COMPREHEN METABOLIC PANEL: CPT

## 2024-11-16 PROCEDURE — 93005 ELECTROCARDIOGRAM TRACING: CPT | Performed by: EMERGENCY MEDICINE

## 2024-11-16 PROCEDURE — 84484 ASSAY OF TROPONIN QUANT: CPT

## 2024-11-16 PROCEDURE — 85025 COMPLETE CBC W/AUTO DIFF WBC: CPT

## 2024-11-16 PROCEDURE — 99285 EMERGENCY DEPT VISIT HI MDM: CPT

## 2024-11-16 PROCEDURE — 84443 ASSAY THYROID STIM HORMONE: CPT

## 2024-11-16 ASSESSMENT — PAIN - FUNCTIONAL ASSESSMENT: PAIN_FUNCTIONAL_ASSESSMENT: NONE - DENIES PAIN

## 2024-11-17 NOTE — ED PROVIDER NOTES
meals)Historical Med             DISCONTINUED MEDICATIONS:  Discharge Medication List as of 11/16/2024 11:36 PM          PATIENT REFERRED TO:  Follow Up with:  Ravi Iyer DO  [address] [place] [address] [phone]          [facility] EMERGENCY DEPT  [address] [address] [phone]  Go to   If symptoms worsen      Dragon Disclaimer     Please note that this dictation was completed with [employer], the computer voice recognition software. Quite often unanticipated grammatical, syntax, homophones, and other interpretive errors are inadvertently transcribed by the computer software. Please disregard these errors. Please excuse any errors that have escaped final proofreading.      I Jovan Torres MD am the primary clinician of record.  Jovan Torres MD  (Electronically signed)            Jovan Torres MD  11/17/24 0642

## 2024-11-17 NOTE — ED TRIAGE NOTES
Pt presents to ED with cc of palpitations and high heart rate since 5:15PM, states HR at home was 140. Pt denies pain. Hx of afib. Cardiologist dr glover.

## 2024-11-18 LAB
EKG ATRIAL RATE: 127 BPM
EKG ATRIAL RATE: 71 BPM
EKG DIAGNOSIS: NORMAL
EKG DIAGNOSIS: NORMAL
EKG P AXIS: 134 DEGREES
EKG P-R INTERVAL: 136 MS
EKG P-R INTERVAL: 174 MS
EKG Q-T INTERVAL: 272 MS
EKG Q-T INTERVAL: 382 MS
EKG QRS DURATION: 140 MS
EKG QRS DURATION: 94 MS
EKG QTC CALCULATION (BAZETT): 395 MS
EKG QTC CALCULATION (BAZETT): 415 MS
EKG R AXIS: 260 DEGREES
EKG R AXIS: 46 DEGREES
EKG T AXIS: -25 DEGREES
EKG T AXIS: -7 DEGREES
EKG VENTRICULAR RATE: 127 BPM
EKG VENTRICULAR RATE: 71 BPM

## 2025-03-01 ENCOUNTER — HOSPITAL ENCOUNTER (EMERGENCY)
Facility: HOSPITAL | Age: 80
Discharge: HOME | End: 2025-03-01
Attending: STUDENT IN AN ORGANIZED HEALTH CARE EDUCATION/TRAINING PROGRAM
Payer: MEDICARE

## 2025-03-01 VITALS
TEMPERATURE: 97.8 F | OXYGEN SATURATION: 97 % | WEIGHT: 133 LBS | BODY MASS INDEX: 23.57 KG/M2 | SYSTOLIC BLOOD PRESSURE: 135 MMHG | RESPIRATION RATE: 16 BRPM | DIASTOLIC BLOOD PRESSURE: 69 MMHG | HEART RATE: 80 BPM | HEIGHT: 63 IN

## 2025-03-01 DIAGNOSIS — R04.0 EPISTAXIS: Primary | ICD-10-CM

## 2025-03-01 LAB
ALBUMIN SERPL-MCNC: 3.6 G/DL (ref 3.4–5)
ALBUMIN/GLOB SERPL: 1.1 (ref 0.8–1.7)
ALP SERPL-CCNC: 57 U/L (ref 45–117)
ALT SERPL-CCNC: 31 U/L (ref 13–56)
ANION GAP SERPL CALC-SCNC: 6 MMOL/L (ref 3–18)
AST SERPL-CCNC: 30 U/L (ref 10–38)
BASOPHILS # BLD: 0.03 K/UL (ref 0–0.1)
BASOPHILS NFR BLD: 0.5 % (ref 0–2)
BILIRUB SERPL-MCNC: 0.4 MG/DL (ref 0.2–1)
BUN SERPL-MCNC: 27 MG/DL (ref 7–18)
BUN/CREAT SERPL: 40 (ref 12–20)
CALCIUM SERPL-MCNC: 9 MG/DL (ref 8.5–10.1)
CHLORIDE SERPL-SCNC: 101 MMOL/L (ref 100–111)
CO2 SERPL-SCNC: 27 MMOL/L (ref 21–32)
CREAT SERPL-MCNC: 0.67 MG/DL (ref 0.6–1.3)
DIFFERENTIAL METHOD BLD: ABNORMAL
EOSINOPHIL # BLD: 0.05 K/UL (ref 0–0.4)
EOSINOPHIL NFR BLD: 0.8 % (ref 0–5)
ERYTHROCYTE [DISTWIDTH] IN BLOOD BY AUTOMATED COUNT: 13.2 % (ref 11.6–14.5)
GLOBULIN SER CALC-MCNC: 3.3 G/DL (ref 2–4)
GLUCOSE SERPL-MCNC: 154 MG/DL (ref 74–99)
HCT VFR BLD AUTO: 38.2 % (ref 35–45)
HGB BLD-MCNC: 12.7 G/DL (ref 12–16)
IMM GRANULOCYTES # BLD AUTO: 0.03 K/UL (ref 0–0.04)
IMM GRANULOCYTES NFR BLD AUTO: 0.5 % (ref 0–0.5)
INR PPP: 1.2 (ref 0.9–1.1)
LYMPHOCYTES # BLD: 0.76 K/UL (ref 0.9–3.3)
LYMPHOCYTES NFR BLD: 12.5 % (ref 21–52)
MCH RBC QN AUTO: 30.5 PG (ref 24–34)
MCHC RBC AUTO-ENTMCNC: 33.2 G/DL (ref 31–37)
MCV RBC AUTO: 91.6 FL (ref 78–100)
MONOCYTES # BLD: 0.57 K/UL (ref 0.05–1.2)
MONOCYTES NFR BLD: 9.4 % (ref 3–10)
NEUTS SEG # BLD: 4.64 K/UL (ref 1.8–8)
NEUTS SEG NFR BLD: 76.3 % (ref 40–73)
NRBC # BLD: 0 K/UL (ref 0–0.01)
NRBC BLD-RTO: 0 PER 100 WBC
PLATELET # BLD AUTO: 202 K/UL (ref 135–420)
PMV BLD AUTO: 9.5 FL (ref 9.2–11.8)
POTASSIUM SERPL-SCNC: 3.8 MMOL/L (ref 3.5–5.5)
PROT SERPL-MCNC: 6.9 G/DL (ref 6.4–8.2)
PROTHROMBIN TIME: 15.8 SEC (ref 11.9–14.9)
RBC # BLD AUTO: 4.17 M/UL (ref 4.2–5.3)
SODIUM SERPL-SCNC: 134 MMOL/L (ref 136–145)
WBC # BLD AUTO: 6.1 K/UL (ref 4.6–13.2)

## 2025-03-01 PROCEDURE — 85610 PROTHROMBIN TIME: CPT

## 2025-03-01 PROCEDURE — 96374 THER/PROPH/DIAG INJ IV PUSH: CPT

## 2025-03-01 PROCEDURE — 96361 HYDRATE IV INFUSION ADD-ON: CPT

## 2025-03-01 PROCEDURE — 6370000000 HC RX 637 (ALT 250 FOR IP)

## 2025-03-01 PROCEDURE — 85025 COMPLETE CBC W/AUTO DIFF WBC: CPT

## 2025-03-01 PROCEDURE — 99284 EMERGENCY DEPT VISIT MOD MDM: CPT

## 2025-03-01 PROCEDURE — 6360000002 HC RX W HCPCS

## 2025-03-01 PROCEDURE — 96375 TX/PRO/DX INJ NEW DRUG ADDON: CPT

## 2025-03-01 PROCEDURE — 80053 COMPREHEN METABOLIC PANEL: CPT

## 2025-03-01 PROCEDURE — 2580000003 HC RX 258

## 2025-03-01 RX ORDER — MORPHINE SULFATE 2 MG/ML
2 INJECTION, SOLUTION INTRAMUSCULAR; INTRAVENOUS
Status: COMPLETED | OUTPATIENT
Start: 2025-03-01 | End: 2025-03-01

## 2025-03-01 RX ORDER — OXYMETAZOLINE HYDROCHLORIDE 0.05 G/100ML
2 SPRAY NASAL
Status: COMPLETED | OUTPATIENT
Start: 2025-03-01 | End: 2025-03-01

## 2025-03-01 RX ORDER — 0.9 % SODIUM CHLORIDE 0.9 %
500 INTRAVENOUS SOLUTION INTRAVENOUS ONCE
Status: COMPLETED | OUTPATIENT
Start: 2025-03-01 | End: 2025-03-01

## 2025-03-01 RX ORDER — ONDANSETRON 2 MG/ML
4 INJECTION INTRAMUSCULAR; INTRAVENOUS
Status: COMPLETED | OUTPATIENT
Start: 2025-03-01 | End: 2025-03-01

## 2025-03-01 RX ADMIN — MORPHINE SULFATE 2 MG: 2 INJECTION, SOLUTION INTRAMUSCULAR; INTRAVENOUS at 16:41

## 2025-03-01 RX ADMIN — SODIUM CHLORIDE 500 ML: 9 INJECTION, SOLUTION INTRAVENOUS at 17:37

## 2025-03-01 RX ADMIN — Medication 2 SPRAY: at 16:41

## 2025-03-01 RX ADMIN — ONDANSETRON 4 MG: 2 INJECTION INTRAMUSCULAR; INTRAVENOUS at 16:40

## 2025-03-01 ASSESSMENT — PAIN - FUNCTIONAL ASSESSMENT: PAIN_FUNCTIONAL_ASSESSMENT: NONE - DENIES PAIN

## 2025-03-01 NOTE — ED PROVIDER NOTES
Allergies    CURRENT MEDICATIONS      No current facility-administered medications for this encounter.     Current Outpatient Medications   Medication Sig Dispense Refill    apixaban (ELIQUIS) 5 MG TABS tablet Take 1 tablet by mouth 2 times daily 60 tablet 0    metoprolol tartrate (LOPRESSOR) 25 MG tablet Take 0.5 tablets by mouth 2 times daily 30 tablet 0    acetaminophen (TYLENOL) 500 MG tablet Take by mouth      ascorbic acid (VITAMIN C) 250 MG tablet Take by mouth      aspirin 81 MG EC tablet Take by mouth      Calcium Carbonate Antacid 1000 MG CHEW Take by mouth      Cholecalciferol 50 MCG (2000 UT) TABS Take 5,000 Units by mouth      cycloSPORINE (RESTASIS) 0.05 % ophthalmic emulsion INSTILL 1 DROP IN BOTH EYES BID      diphenhydrAMINE (BENADRYL) 25 MG capsule Take by mouth      estradiol (ESTRACE) 0.1 MG/GM vaginal cream Place vaginally      lovastatin (MEVACOR) 20 MG tablet Indications: pt. takes 1/2 tablet nightly      meclizine (ANTIVERT) 25 MG tablet Take by mouth      metFORMIN (GLUCOPHAGE) 500 MG tablet Take by mouth 2 times daily (with meals)            PHYSICAL EXAM      Vitals:    03/01/25 1552 03/01/25 1745   BP: (!) 159/72 135/69   Pulse: 80    Resp: 16    Temp: 97.8 °F (36.6 °C)    SpO2: 100% 97%   Weight: 60.3 kg (133 lb)    Height: 1.6 m (5' 3\")      Physical Exam  Vitals and nursing note reviewed.   Constitutional:       General: She is not in acute distress.     Appearance: Normal appearance. She is not toxic-appearing.   HENT:      Head: Normocephalic and atraumatic.      Nose: Septal deviation present. No nasal deformity, signs of injury or nasal tenderness.      Right Nostril: Epistaxis present.      Comments: Reports hx of deviated septum.      Mouth/Throat:      Mouth: Mucous membranes are moist.      Pharynx: Oropharynx is clear. Uvula midline.   Eyes:      Extraocular Movements: Extraocular movements intact.      Conjunctiva/sclera: Conjunctivae normal.      Pupils: Pupils are equal,  needed, If symptoms worsen    [facility]  [address] [place] [id number] [phone]    As needed, If symptoms worsen         DISCHARGE MEDICATIONS:     Medication List        ASK your doctor about these medications      acetaminophen 500 MG tablet  Commonly known as: TYLENOL     apixaban 5 MG Tabs tablet  Commonly known as: Eliquis  Take 1 tablet by mouth 2 times daily     ascorbic acid 250 MG tablet  Commonly known as: VITAMIN C     aspirin 81 MG EC tablet     Calcium Carbonate Antacid 1000 MG Chew     Cholecalciferol 50 MCG (2000 UT) Tabs     diphenhydrAMINE 25 MG capsule  Commonly known as: BENADRYL     estradiol 0.1 MG/GM vaginal cream  Commonly known as: ESTRACE     lovastatin 20 MG tablet  Commonly known as: MEVACOR     meclizine 25 MG tablet  Commonly known as: ANTIVERT     metFORMIN 500 MG tablet  Commonly known as: GLUCOPHAGE     metoprolol tartrate 25 MG tablet  Commonly known as: LOPRESSOR  Take 0.5 tablets by mouth 2 times daily     Restasis 0.05 % ophthalmic emulsion  Generic drug: cycloSPORINE             I am the Primary Clinician of Record.       (Please note that parts of this dictation were completed with voice recognition software. Quite often unanticipated grammatical, syntax, homophones, and other interpretive errors are inadvertently transcribed by the computer software. Please disregards these errors. Please excuse any errors that have escaped final proofreading.)      Keerthi Torres PA-C  03/01/25 1855       Keerthi Torres PA-C  03/01/25 [id number]

## 2025-03-01 NOTE — ED TRIAGE NOTES
Patient reports she is currently on eliquis bid. Reports she has been having nose bleeds everyday for past 3 days. Today it is gushing and will not stop bleeding. Nose bleeding in triage

## 2025-03-01 NOTE — FLOWSHEET NOTE
03/01/25 1559   VIOLETA MCDANIEL (WDL) X   Nose Ecchymosis     Intermittent since Wednesday. This one started at 1430.   Started thinners in August.

## 2025-03-01 NOTE — FLOWSHEET NOTE
03/01/25 1856   Departure Condition   Mobility at Departure Wheelchair   Departure Mode With family   Discharged To Home   Patient Teaching Discharge instructions reviewed;Patient verbalized understanding